# Patient Record
Sex: FEMALE | Race: BLACK OR AFRICAN AMERICAN | NOT HISPANIC OR LATINO | Employment: FULL TIME | URBAN - METROPOLITAN AREA
[De-identification: names, ages, dates, MRNs, and addresses within clinical notes are randomized per-mention and may not be internally consistent; named-entity substitution may affect disease eponyms.]

---

## 2018-04-11 ENCOUNTER — HISTORICAL (OUTPATIENT)
Dept: ADMINISTRATIVE | Facility: HOSPITAL | Age: 28
End: 2018-04-11

## 2018-04-11 LAB
HAV IGM SERPL QL IA: NONREACTIVE
HBV CORE IGM SERPL QL IA: NONREACTIVE
HBV SURFACE AG SERPL QL IA: NEGATIVE
HCV AB SERPL QL IA: NONREACTIVE
HIV 1+2 AB+HIV1 P24 AG SERPL QL IA: NONREACTIVE
POC BETA-HCG (QUAL): NEGATIVE
T PALLIDUM AB SER QL: NONREACTIVE

## 2018-07-25 LAB — POC BETA-HCG (QUAL): NEGATIVE

## 2019-05-08 LAB — POC BETA-HCG (QUAL): NEGATIVE

## 2019-06-15 LAB
BILIRUB SERPL-MCNC: NORMAL MG/DL
BLOOD URINE, POC: NEGATIVE
CLARITY, POC UA: NORMAL
COLOR, POC UA: NORMAL
GLUCOSE UR QL STRIP: NEGATIVE
KETONES UR QL STRIP: NEGATIVE
LEUKOCYTE EST, POC UA: NEGATIVE
NITRITE, POC UA: NEGATIVE
PH, POC UA: 6
PROTEIN, POC: NORMAL
SPECIFIC GRAVITY, POC UA: 1.02
UROBILINOGEN, POC UA: NORMAL

## 2019-08-09 ENCOUNTER — HISTORICAL (OUTPATIENT)
Dept: ADMINISTRATIVE | Facility: HOSPITAL | Age: 29
End: 2019-08-09

## 2019-08-09 LAB
B-HCG SERPL QL: NEGATIVE
FSH SERPL-ACNC: 5.2 MIU/ML
LH SERPL-ACNC: 11.1 MIU/ML
POC BETA-HCG (QUAL): NEGATIVE
T4 FREE SERPL-MCNC: 0.88 NG/DL (ref 0.76–1.46)
TSH SERPL-ACNC: 1.67 MIU/L (ref 0.36–3.74)

## 2019-08-23 ENCOUNTER — HISTORICAL (OUTPATIENT)
Dept: RADIOLOGY | Facility: HOSPITAL | Age: 29
End: 2019-08-23

## 2022-04-11 ENCOUNTER — HISTORICAL (OUTPATIENT)
Dept: ADMINISTRATIVE | Facility: HOSPITAL | Age: 32
End: 2022-04-11

## 2022-04-28 VITALS
OXYGEN SATURATION: 100 % | BODY MASS INDEX: 31.77 KG/M2 | DIASTOLIC BLOOD PRESSURE: 78 MMHG | SYSTOLIC BLOOD PRESSURE: 113 MMHG | HEIGHT: 72 IN | WEIGHT: 234.56 LBS

## 2022-09-21 ENCOUNTER — HISTORICAL (OUTPATIENT)
Dept: ADMINISTRATIVE | Facility: HOSPITAL | Age: 32
End: 2022-09-21

## 2024-03-19 ENCOUNTER — HOSPITAL ENCOUNTER (EMERGENCY)
Facility: HOSPITAL | Age: 34
Discharge: HOME | End: 2024-03-19
Attending: EMERGENCY MEDICINE
Payer: MEDICAID

## 2024-03-19 VITALS
SYSTOLIC BLOOD PRESSURE: 127 MMHG | TEMPERATURE: 98 F | BODY MASS INDEX: 32.67 KG/M2 | HEIGHT: 72 IN | HEART RATE: 73 BPM | OXYGEN SATURATION: 100 % | DIASTOLIC BLOOD PRESSURE: 92 MMHG | RESPIRATION RATE: 16 BRPM | WEIGHT: 241.19 LBS

## 2024-03-19 DIAGNOSIS — Z13.9 SCREENING DUE: ICD-10-CM

## 2024-03-19 DIAGNOSIS — R07.9 CHEST PAIN: ICD-10-CM

## 2024-03-19 DIAGNOSIS — K29.70 GASTRITIS, PRESENCE OF BLEEDING UNSPECIFIED, UNSPECIFIED CHRONICITY, UNSPECIFIED GASTRITIS TYPE: Primary | ICD-10-CM

## 2024-03-19 LAB
ALBUMIN SERPL-MCNC: 3.8 G/DL (ref 3.5–5)
ALBUMIN/GLOB SERPL: 0.9 RATIO (ref 1.1–2)
ALP SERPL-CCNC: 91 UNIT/L (ref 40–150)
ALT SERPL-CCNC: 15 UNIT/L (ref 0–55)
AST SERPL-CCNC: 16 UNIT/L (ref 5–34)
BASOPHILS # BLD AUTO: 0.03 X10(3)/MCL
BASOPHILS NFR BLD AUTO: 0.5 %
BILIRUB SERPL-MCNC: 0.4 MG/DL
BUN SERPL-MCNC: 5.7 MG/DL (ref 7–18.7)
CALCIUM SERPL-MCNC: 9.9 MG/DL (ref 8.4–10.2)
CHLORIDE SERPL-SCNC: 104 MMOL/L (ref 98–107)
CK MB SERPL-MCNC: <1 NG/ML
CK SERPL-CCNC: 163 U/L (ref 29–168)
CO2 SERPL-SCNC: 28 MMOL/L (ref 22–29)
CREAT SERPL-MCNC: 0.67 MG/DL (ref 0.55–1.02)
EOSINOPHIL # BLD AUTO: 0.12 X10(3)/MCL (ref 0–0.9)
EOSINOPHIL NFR BLD AUTO: 2 %
ERYTHROCYTE [DISTWIDTH] IN BLOOD BY AUTOMATED COUNT: 12.9 % (ref 11.5–17)
GFR SERPLBLD CREATININE-BSD FMLA CKD-EPI: >60 MLS/MIN/1.73/M2
GLOBULIN SER-MCNC: 4.2 GM/DL (ref 2.4–3.5)
GLUCOSE SERPL-MCNC: 96 MG/DL (ref 74–100)
HCT VFR BLD AUTO: 38.4 % (ref 37–47)
HGB BLD-MCNC: 13.3 G/DL (ref 12–16)
HOLD SPECIMEN: NORMAL
HOLD SPECIMEN: NORMAL
IMM GRANULOCYTES # BLD AUTO: 0.01 X10(3)/MCL (ref 0–0.04)
IMM GRANULOCYTES NFR BLD AUTO: 0.2 %
LYMPHOCYTES # BLD AUTO: 2.55 X10(3)/MCL (ref 0.6–4.6)
LYMPHOCYTES NFR BLD AUTO: 42.6 %
MCH RBC QN AUTO: 30.3 PG (ref 27–31)
MCHC RBC AUTO-ENTMCNC: 34.6 G/DL (ref 33–36)
MCV RBC AUTO: 87.5 FL (ref 80–94)
MONOCYTES # BLD AUTO: 0.29 X10(3)/MCL (ref 0.1–1.3)
MONOCYTES NFR BLD AUTO: 4.8 %
NEUTROPHILS # BLD AUTO: 2.99 X10(3)/MCL (ref 2.1–9.2)
NEUTROPHILS NFR BLD AUTO: 49.9 %
NRBC BLD AUTO-RTO: 0 %
OHS QRS DURATION: 80 MS
OHS QTC CALCULATION: 413 MS
PLATELET # BLD AUTO: 232 X10(3)/MCL (ref 130–400)
PMV BLD AUTO: 9.6 FL (ref 7.4–10.4)
POTASSIUM SERPL-SCNC: 3.5 MMOL/L (ref 3.5–5.1)
PROT SERPL-MCNC: 8 GM/DL (ref 6.4–8.3)
RBC # BLD AUTO: 4.39 X10(6)/MCL (ref 4.2–5.4)
SODIUM SERPL-SCNC: 138 MMOL/L (ref 136–145)
TROPONIN I SERPL-MCNC: <0.01 NG/ML (ref 0–0.04)
WBC # SPEC AUTO: 5.99 X10(3)/MCL (ref 4.5–11.5)

## 2024-03-19 PROCEDURE — 93005 ELECTROCARDIOGRAM TRACING: CPT

## 2024-03-19 PROCEDURE — 25000003 PHARM REV CODE 250: Performed by: EMERGENCY MEDICINE

## 2024-03-19 PROCEDURE — 99285 EMERGENCY DEPT VISIT HI MDM: CPT | Mod: 25

## 2024-03-19 PROCEDURE — 80053 COMPREHEN METABOLIC PANEL: CPT | Performed by: EMERGENCY MEDICINE

## 2024-03-19 PROCEDURE — 85025 COMPLETE CBC W/AUTO DIFF WBC: CPT | Performed by: EMERGENCY MEDICINE

## 2024-03-19 PROCEDURE — 84484 ASSAY OF TROPONIN QUANT: CPT | Performed by: EMERGENCY MEDICINE

## 2024-03-19 PROCEDURE — 82553 CREATINE MB FRACTION: CPT | Performed by: EMERGENCY MEDICINE

## 2024-03-19 PROCEDURE — 82550 ASSAY OF CK (CPK): CPT | Performed by: EMERGENCY MEDICINE

## 2024-03-19 RX ORDER — ALUMINUM HYDROXIDE, MAGNESIUM HYDROXIDE, AND SIMETHICONE 1200; 120; 1200 MG/30ML; MG/30ML; MG/30ML
30 SUSPENSION ORAL
Status: COMPLETED | OUTPATIENT
Start: 2024-03-19 | End: 2024-03-19

## 2024-03-19 RX ORDER — ASPIRIN 325 MG
325 TABLET ORAL
Status: COMPLETED | OUTPATIENT
Start: 2024-03-19 | End: 2024-03-19

## 2024-03-19 RX ADMIN — ASPIRIN 325 MG ORAL TABLET 325 MG: 325 PILL ORAL at 12:03

## 2024-03-19 RX ADMIN — ALUMINUM HYDROXIDE, MAGNESIUM HYDROXIDE, AND DIMETHICONE 30 ML: 200; 20; 200 SUSPENSION ORAL at 12:03

## 2024-03-19 NOTE — ED PROVIDER NOTES
Encounter Date: 3/19/2024       History     Chief Complaint   Patient presents with    Chest Pain     Pt in with complaints of chest pain that started a couple of weeks ago after starting blood pressure medication.     This is a 33-year-old woman who presents with chest pain.  She reports ongoing intermittent symptoms for 2 months.  She was seen twice previously in the ED in [facility] at which time lab data, chest x-ray, CT angiography negative.  She was started on medications for blood pressure, which she is still taking.  Patient had been recommended a stress test, which has not been completed given she moved to [place].  She is presently requesting she outpatient follow up for ongoing health maintenance.        Review of patient's allergies indicates:   Allergen Reactions    Bactrim [sulfamethoxazole-trimethoprim] Rash    Hydrocodone Rash     Past Medical History:   Diagnosis Date    Hypertension      Past Surgical History:   Procedure Laterality Date     SECTION  2016     Family History   Problem Relation Name Age of Onset    Hypertension Mother      Hypertension Father      Diabetes Sister      Hypertension Sister      Hypertension Brother       Social History     Tobacco Use    Smoking status: Never    Smokeless tobacco: Never   Substance Use Topics    Alcohol use: Yes     Comment: Wine on occasions    Drug use: Never     Review of Systems    Physical Exam     Initial Vitals [24 1226]   BP Pulse Resp Temp SpO2   (!) 138/100 94 14 97.8 °F (36.6 °C) 100 %      MAP       --         Physical Exam    Nursing note and vitals reviewed.  Constitutional: She appears well-developed and well-nourished. She is not diaphoretic. No distress.   HENT:   Head: Normocephalic and atraumatic.   Right Ear: External ear normal.   Left Ear: External ear normal.   Eyes: EOM are normal. Pupils are equal, round, and reactive to light. Right eye exhibits no discharge. Left eye exhibits no discharge.   Neck: Neck  supple. No thyromegaly present. No tracheal deviation present. No JVD present.   Normal range of motion.  Cardiovascular:  Normal rate, regular rhythm, normal heart sounds and intact distal pulses.     Exam reveals no gallop and no friction rub.       No murmur heard.  Pulmonary/Chest: Breath sounds normal. No stridor. No respiratory distress. She has no wheezes. She has no rhonchi. She has no rales.   Abdominal: Abdomen is soft. Bowel sounds are normal. She exhibits no distension. There is no abdominal tenderness. There is no rebound and no guarding.   Musculoskeletal:         General: No tenderness or edema. Normal range of motion.      Cervical back: Normal range of motion and neck supple.     Neurological: She is alert and oriented to person, place, and time. She has normal strength. No cranial nerve deficit or sensory deficit. GCS score is 15. GCS eye subscore is 4. GCS verbal subscore is 5. GCS motor subscore is 6.   Skin: Skin is warm and dry. Capillary refill takes less than 2 seconds. No rash and no abscess noted. No erythema. No pallor.   Psychiatric: She has a normal mood and affect. Her behavior is normal. Judgment and thought content normal.         ED Course   Procedures  Labs Reviewed   COMPREHENSIVE METABOLIC PANEL - Abnormal; Notable for the following components:       Result Value    Blood Urea Nitrogen 5.7 (*)     Globulin 4.2 (*)     Albumin/Globulin Ratio 0.9 (*)     All other components within normal limits   CK-MB - Normal   CK - Normal   TROPONIN I - Normal   CBC W/ AUTO DIFFERENTIAL    Narrative:     The following orders were created for panel order CBC auto differential.  Procedure                               Abnormality         Status                     ---------                               -----------         ------                     CBC with Differential[[id number]]                           Final result                 Please view results for these tests on the individual orders.    CBC WITH DIFFERENTIAL   EXTRA TUBES    Narrative:     The following orders were created for panel order EXTRA TUBES.  Procedure                               Abnormality         Status                     ---------                               -----------         ------                     Light Blue Top Hold[[id number]]                             Final result               Gold Top Hold[[id number]]                                   Final result                 Please view results for these tests on the individual orders.   LIGHT BLUE TOP HOLD   GOLD TOP HOLD     EKG Readings: (Independently Interpreted)   NSR @ 81, non acute and non ischemic appearing ;     ECG Results              EKG 12-lead (Final result)        Collection Time Result Time QRS Duration [facility] QTC Calculation    03/19/24 13:02:11 03/20/24 18:54:23 78 423                     Final result by Interface, Lab In [facility] (03/20/24 18:54:31)                   Narrative:    Test Reason : Z13.9,    Vent. Rate : 083 BPM     Atrial Rate : 083 BPM     P-R Int : 154 ms          QRS Dur : 078 ms      QT Int : 360 ms       P-R-T Axes : 072 051 030 degrees     QTc Int : 423 ms    Normal sinus rhythm  Normal ECG  When compared with ECG of 19-MAR-2024 12:23,  No significant change was found  Confirmed by Oleg Giles MD [id number]) on 3/20/2024 6:54:18 PM    Referred By: [email or web address]   SELF           Confirmed By:Oleg Giles MD                                     EKG 12-lead (Chest Pain) Age >30 (Final result)        Collection Time Result Time QRS Duration OHS QTC Calculation    03/19/24 12:23:48 03/19/24 14:51:39 80 413                     Final result by Interface, Lab In [facility] (03/19/24 14:51:47)                   Narrative:    Test Reason : R07.9,    Vent. Rate : 081 BPM     Atrial Rate : 081 BPM     P-R Int : 152 ms          QRS Dur : 080 ms      QT Int : 356 ms       P-R-T Axes : 056 012 020 degrees     QTc Int : 413 ms    Normal sinus rhythm  Normal  ECG  No previous ECGs available  Confirmed by Alice Reeves MD ([id number]) on 3/19/2024 2:51:36 PM    Referred By:             Confirmed By:Alice Reeves MD                                     EKG 12-LEAD (Final result)  Result time 03/22/24 12:23:07      Final result by Unknown User (03/22/24 12:23:07)                                      Imaging Results              X-Ray Chest AP Portable (Final result)  Result time 03/19/24 14:00:23      Final result by Shreyas Jackson MD (03/19/24 14:00:23)                   Impression:      No acute chest disease is identified.      Electronically signed by: Shreyas Jackson  Date:    03/19/2024  Time:    14:00               Narrative:    EXAMINATION:  XR CHEST AP PORTABLE    CLINICAL HISTORY:  , Chest pain, unspecified.    COMPARISON:  April 15, 2015    FINDINGS:  No alveolar consolidation, effusion, or pneumothorax is seen.   The thoracic aorta is normal  cardiac silhouette, central pulmonary vessels and mediastinum are normal in size and are grossly unremarkable.   visualized osseous structures are grossly unremarkable.                                    X-Rays:   Independently Interpreted Readings:   Other Readings:  - cxr without acute abnormal findings ;    Medications   aluminum-magnesium hydroxide-simethicone 200-200-20 mg/5 mL suspension 30 mL (30 mLs Oral Given 3/19/24 1255)   aspirin tablet 325 mg (325 mg Oral Given 3/19/24 1255)     Medical Decision Making  Patient is here with continuing abdominal pain seemingly related to exertion.  Patient has had a largely negative evaluation thus far.  Patient has moved to [place] and is seeking links to outpatient follow up for possible stress testing.  Findings today seemingly most compatible with GERD.    Amount and/or Complexity of Data Reviewed  Labs: ordered. Decision-making details documented in ED Course.     Details: As above;  Radiology: ordered and independent interpretation performed. Decision-making details  documented in ED Course.     Details: - cxr without acute abnormal findings ;  ECG/medicine tests: ordered and independent interpretation performed. Decision-making details documented in ED Course.     Details: NSR @ 81, non acute and non ischemic appearing ;    Risk  OTC drugs.  Risk Details: Risk found sufficient to obtain somewhat expanded evaluation with objective data today.  The data resulted in found reassuring.  Patient is improved with conservative interventions.  Plan home with anticipatory guidance, links to follow up, return precautions.  Discharged in stable condition without event.               ED Course as of 04/15/24 0801   Tue Mar 19, 2024   1331 Reassuring chemistries ; [CT]   1332 Normal total ck ; [CT]   1332 Normal hemogram ; [CT]   1332 Negative troponin ; [CT]   1335 Normal ckmb ; [CT]      ED Course User Index  [CT] Igor Ugarte MD                           Clinical Impression:  Final diagnoses:  [R07.9] Chest pain  [Z13.9] Screening due  [K29.70] Gastritis, presence of bleeding unspecified, unspecified chronicity, unspecified gastritis type (Primary)          ED Disposition Condition    Discharge Stable          ED Prescriptions    None       Follow-up Information       Follow up With Specialties Details Why Contact Info    [facility] - Emergency Dept Emergency Medicine  As needed, If symptoms worsen [address] [facility] [id number] [phone]    Internal medicine    Expected telephone call to set up your follow up appointment.             Igor Ugarte MD  03/19/24 1417       Igor Ugarte MD  04/15/24 0705

## 2024-03-20 LAB
OHS QRS DURATION: 78 MS
OHS QTC CALCULATION: 423 MS

## 2024-04-03 ENCOUNTER — OFFICE VISIT (OUTPATIENT)
Dept: INTERNAL MEDICINE | Facility: CLINIC | Age: 34
End: 2024-04-03
Payer: MEDICAID

## 2024-04-03 VITALS
BODY MASS INDEX: 32.4 KG/M2 | TEMPERATURE: 98 F | DIASTOLIC BLOOD PRESSURE: 80 MMHG | RESPIRATION RATE: 18 BRPM | WEIGHT: 239.19 LBS | HEART RATE: 89 BPM | HEIGHT: 72 IN | SYSTOLIC BLOOD PRESSURE: 123 MMHG

## 2024-04-03 DIAGNOSIS — E78.49 OTHER HYPERLIPIDEMIA: ICD-10-CM

## 2024-04-03 DIAGNOSIS — I10 HYPERTENSION, UNSPECIFIED TYPE: ICD-10-CM

## 2024-04-03 DIAGNOSIS — E66.9 OBESITY, UNSPECIFIED CLASSIFICATION, UNSPECIFIED OBESITY TYPE, UNSPECIFIED WHETHER SERIOUS COMORBIDITY PRESENT: ICD-10-CM

## 2024-04-03 DIAGNOSIS — Z00.00 WELLNESS EXAMINATION: ICD-10-CM

## 2024-04-03 DIAGNOSIS — Z11.3 SCREEN FOR STD (SEXUALLY TRANSMITTED DISEASE): ICD-10-CM

## 2024-04-03 PROCEDURE — 3074F SYST BP LT 130 MM HG: CPT | Mod: CPTII,,, | Performed by: NURSE PRACTITIONER

## 2024-04-03 PROCEDURE — 3008F BODY MASS INDEX DOCD: CPT | Mod: CPTII,,, | Performed by: NURSE PRACTITIONER

## 2024-04-03 PROCEDURE — 99214 OFFICE O/P EST MOD 30 MIN: CPT | Mod: PBBFAC | Performed by: NURSE PRACTITIONER

## 2024-04-03 PROCEDURE — 1160F RVW MEDS BY RX/DR IN RCRD: CPT | Mod: CPTII,,, | Performed by: NURSE PRACTITIONER

## 2024-04-03 PROCEDURE — 4010F ACE/ARB THERAPY RXD/TAKEN: CPT | Mod: CPTII,,, | Performed by: NURSE PRACTITIONER

## 2024-04-03 PROCEDURE — 99204 OFFICE O/P NEW MOD 45 MIN: CPT | Mod: S$PBB,,, | Performed by: NURSE PRACTITIONER

## 2024-04-03 PROCEDURE — 1159F MED LIST DOCD IN RCRD: CPT | Mod: CPTII,,, | Performed by: NURSE PRACTITIONER

## 2024-04-03 PROCEDURE — 3079F DIAST BP 80-89 MM HG: CPT | Mod: CPTII,,, | Performed by: NURSE PRACTITIONER

## 2024-04-03 RX ORDER — LOSARTAN POTASSIUM 25 MG/1
25 TABLET ORAL DAILY
COMMUNITY
End: 2024-04-03 | Stop reason: SDUPTHER

## 2024-04-03 RX ORDER — AMLODIPINE BESYLATE 5 MG/1
5 TABLET ORAL NIGHTLY
COMMUNITY
End: 2024-04-03 | Stop reason: SDUPTHER

## 2024-04-03 RX ORDER — OMEPRAZOLE 20 MG/1
20 CAPSULE, DELAYED RELEASE ORAL DAILY
Qty: 30 CAPSULE | Refills: 0 | Status: SHIPPED | OUTPATIENT
Start: 2024-04-03 | End: 2025-04-03

## 2024-04-03 RX ORDER — LOSARTAN POTASSIUM 50 MG/1
50 TABLET ORAL DAILY
Qty: 90 TABLET | Refills: 1 | Status: SHIPPED | OUTPATIENT
Start: 2024-04-03

## 2024-04-03 RX ORDER — AMLODIPINE BESYLATE 5 MG/1
5 TABLET ORAL NIGHTLY
Qty: 90 TABLET | Refills: 1 | Status: SHIPPED | OUTPATIENT
Start: 2024-04-03

## 2024-04-03 NOTE — PROGRESS NOTES
Internal Medicine Clinic  MARCELO Castellon     Patient Name: Van Mariano   : 1990  MRN:[id number]     Chief Complaint     Chief Complaint   Patient presents with    [facility]     Hx of HTN        History of Present Illness     33 year old AAF, presents in clinic to establish PCP. PMH HTN, HLD, GERD, obesity. BMI 32. Nonsmoker. Reports intermittent steven lower ext swelling no pitting.   Denies chest pain, shortness of breath, cough, fever, headache, dizziness, weakness, abdominal pain, nausea, vomiting, diarrhea, constipation , dysuria, depression, anxiety.  Has twins, 7 yrs old (boy and girl). Recently moved to [place], [place] from [place], originally from [place]. Employed by [employer].   LMP 3/22/2024. Last had DEPO shot 2023.          Review of Systems     Review of Systems   Constitutional: Negative.    HENT: Negative.     Eyes: Negative.    Respiratory: Negative.     Cardiovascular: Negative.    Gastrointestinal: Negative.    Endocrine: Negative.    Genitourinary: Negative.    Musculoskeletal: Negative.    Integumentary:  Negative.   Allergic/Immunologic: Negative.    Neurological: Negative.    Hematological: Negative.    Psychiatric/Behavioral: Negative.     All other systems reviewed and are negative.       Physical Examination     Visit Vitals  /80 (BP Location: Left arm, Patient Position: Sitting, BP Method: Large (Automatic))   Pulse 89   Temp 98 °F (36.7 °C) (Oral)   Resp 18   Ht 6' (1.829 m)   Wt 108.5 kg (239 lb 3.2 oz)   BMI 32.44 kg/m²        BP Readings from Last 6 Encounters:   24 123/80   24 (!) 127/92   08/15/19 113/78   ]    Wt Readings from Last 6 Encounters:   24 108.5 kg (239 lb 3.2 oz)   24 109.4 kg (241 lb 2.9 oz)   08/15/19 106.4 kg (234 lb 9.1 oz)   ]      Physical Exam  Vitals and nursing note reviewed.   Constitutional:       Appearance: Normal appearance.   HENT:      Head: Normocephalic and atraumatic.      Right Ear: Tympanic  "membrane, ear canal and external ear normal.      Left Ear: Tympanic membrane, ear canal and external ear normal.      Nose: Nose normal.      Mouth/Throat:      Mouth: Mucous membranes are moist.      Pharynx: Oropharynx is clear.   Eyes:      Extraocular Movements: Extraocular movements intact.      Conjunctiva/sclera: Conjunctivae normal.      Pupils: Pupils are equal, round, and reactive to light.   Cardiovascular:      Rate and Rhythm: Normal rate and regular rhythm.      Pulses: Normal pulses.      Heart sounds: Normal heart sounds.   Pulmonary:      Effort: Pulmonary effort is normal.      Breath sounds: Normal breath sounds.   Abdominal:      General: Abdomen is flat. Bowel sounds are normal.      Palpations: Abdomen is soft.   Musculoskeletal:         General: Normal range of motion.      Cervical back: Normal range of motion and neck supple.   Skin:     General: Skin is warm and dry.      Capillary Refill: Capillary refill takes less than 2 seconds.   Neurological:      General: No focal deficit present.      Mental Status: She is alert and oriented to person, place, and time. Mental status is at baseline.   Psychiatric:         Mood and Affect: Mood normal.         Behavior: Behavior normal.         Thought Content: Thought content normal.         Judgment: Judgment normal.          Labs / Imaging     Chemistry:  Lab Results   Component Value Date     03/19/2024    K 3.5 03/19/2024    CHLORIDE 104 03/19/2024    BUN 5.7 (L) 03/19/2024    CREATININE 0.67 03/19/2024    EGFRNORACEVR >60 03/19/2024    GLUCOSE 96 03/19/2024    CALCIUM 9.9 03/19/2024    ALKPHOS 91 03/19/2024    LABPROT 8.0 03/19/2024    ALBUMIN 3.8 03/19/2024    BILIDIR 0.1 04/01/2020    IBILI 0.30 04/01/2020    AST 16 03/19/2024    ALT 15 03/19/2024        No results found for: "HGBA1C", "MICROALBCREA"     Hematology:  Lab Results   Component Value Date    WBC 5.99 03/19/2024    RBC 4.39 03/19/2024    HGB 13.3 03/19/2024    HCT 38.4 " "03/19/2024    MCV 87.5 03/19/2024    MCH 30.3 03/19/2024    MCHC 34.6 03/19/2024    RDW 12.9 03/19/2024     03/19/2024    MPV 9.6 03/19/2024        Lipid Panel:  No results found for: "CHOL", "HDL", "LDL", "TRIG", "TOTALCHOLEST"     Urine:  Lab Results   Component Value Date    APPEARANCEUA CLEAR 07/06/2019    PROTEINUA Negative 07/06/2019    LEUKOCYTESUR Trace (A) 07/06/2019    RBCUA NONE SEEN 07/06/2019    WBCUA 6 (H) 07/06/2019    BACTERIA 1+ (A) 07/06/2019          Assessment       ICD-10-CM ICD-9-CM   1. Hypertension, unspecified type  I10 401.9   2. Other hyperlipidemia  E78.49 272.4   3. Wellness examination  Z00.00 V70.0   4. Screen for STD (sexually transmitted disease)  Z11.3 V74.5   5. Obesity, unspecified classification, unspecified obesity type, unspecified whether serious comorbidity present  E66.9 278.00        Plan   1. Hypertension, unspecified type  BP and HR stable. Med refills. DASH diet: Eat more fruits, vegetables, and low fat dairy foods.  (Less than 2 grams of sodium per day).  Maintain healthy weight with goal BMI <30.   Exercise 30 minutes per day 5 days per week.  Home medications refilled and continued.   Home BP monitoring encouraged with BP parameters given.      2. Other hyperlipidemia    FLP ordered. Take Omega 3 daily.   Stressed importance of dietary modifications. Follow a low cholesterol, low saturated fat diet with less that 200mg of cholesterol a day.  Avoid fried foods and high saturated fats (high saturated fats less than 7% of calories).  Add Flax Seed/Fish Oil supplements to diet. Increase dietary fiber.  Regular exercise can reduce LDL and raise HDL. Stressed importance of physical activity 5 times per week for 30 minutes per day.      3. Wellness examination    - CBC Auto Differential; Future  - Comprehensive Metabolic Panel; Future  - Lipid Panel; Future  - TSH; Future  - Urinalysis; Future  - Vitamin D; Future  - HIV 1/2 Ag/Ab (4th Gen); Future  - Chlamydia/GC, " PCR; Future  - SYPHILIS ANTIBODY (WITH REFLEX RPR); Future  - Hepatitis Panel, Acute; Future  - Ambulatory referral/consult to Gynecology; Future    4. Screen for STD (sexually transmitted disease)    - Urinalysis; Future  - HIV 1/2 Ag/Ab (4th Gen); Future  - Chlamydia/GC, PCR; Future  - SYPHILIS ANTIBODY (WITH REFLEX RPR); Future  - Hepatitis Panel, Acute; Future    5. Obesity, unspecified classification, unspecified obesity type, unspecified whether serious comorbidity present  Goal BMI <30.  Exercise 5 times a week for 30 minutes per day.  Avoid soda, simple sugars, excessive rice, potatoes or bread. Limit fast foods and fried foods.  Choose complex carbs in moderation (example: green vegetables, beans, oatmeal). Eat plenty of fresh fruits and vegetables with lean meats daily.  Do not skip meals. Eat a balanced portion size.  Avoid fad diets. Consider permanent healthy life style changes.         Current Outpatient Medications   Medication Instructions    amLODIPine (NORVASC) 5 mg, Oral, Nightly    losartan (COZAAR) 50 mg, Oral, Daily    omeprazole (PRILOSEC) 20 mg, Oral, Daily       Orders Placed This Encounter   Procedures    Chlamydia/GC, PCR    CBC Auto Differential    Comprehensive Metabolic Panel    Lipid Panel    TSH    Urinalysis    Vitamin D    HIV 1/2 Ag/Ab (4th Gen)    SYPHILIS ANTIBODY (WITH REFLEX RPR)    Hepatitis Panel, Acute    Ambulatory referral/consult to Gynecology         Future Appointments   Date Time Provider Department Center   4/30/2024  2:20 PM Felipa [facility]lett [facility]    1/8/2025  7:50 AM Anne Alston FNP [facility]          Follow up in about 2 weeks (around 4/17/2024) for lab review.    Labs thoroughly reviewed with patient. Medication refills addressed today.  RTC prn and 2 wks, with labs 1 week prior to the apt.  COVID 19 precautions given to patient.  Patient voices understanding of all discharge instructions.      eFlipa Bartlett  FNP

## 2024-05-21 ENCOUNTER — OFFICE VISIT (OUTPATIENT)
Dept: GYNECOLOGY | Facility: CLINIC | Age: 34
End: 2024-05-21
Payer: MEDICAID

## 2024-05-21 ENCOUNTER — LAB VISIT (OUTPATIENT)
Dept: LAB | Facility: HOSPITAL | Age: 34
End: 2024-05-21
Payer: MEDICAID

## 2024-05-21 VITALS
TEMPERATURE: 99 F | SYSTOLIC BLOOD PRESSURE: 131 MMHG | HEART RATE: 92 BPM | OXYGEN SATURATION: 100 % | DIASTOLIC BLOOD PRESSURE: 68 MMHG | HEIGHT: 72 IN | WEIGHT: 230 LBS | BODY MASS INDEX: 31.15 KG/M2 | RESPIRATION RATE: 14 BRPM

## 2024-05-21 DIAGNOSIS — Z11.3 SCREENING FOR STD (SEXUALLY TRANSMITTED DISEASE): ICD-10-CM

## 2024-05-21 DIAGNOSIS — Z12.4 ENCOUNTER FOR PAPANICOLAOU SMEAR FOR CERVICAL CANCER SCREENING: Primary | ICD-10-CM

## 2024-05-21 DIAGNOSIS — Z11.3 SCREEN FOR STD (SEXUALLY TRANSMITTED DISEASE): ICD-10-CM

## 2024-05-21 DIAGNOSIS — Z00.00 WELLNESS EXAMINATION: ICD-10-CM

## 2024-05-21 LAB
25(OH)D3+25(OH)D2 SERPL-MCNC: 20 NG/ML (ref 30–80)
ALBUMIN SERPL-MCNC: 3.9 G/DL (ref 3.5–5)
ALBUMIN/GLOB SERPL: 0.9 RATIO (ref 1.1–2)
ALP SERPL-CCNC: 79 UNIT/L (ref 40–150)
ALT SERPL-CCNC: 13 UNIT/L (ref 0–55)
ANION GAP SERPL CALC-SCNC: 8 MEQ/L
AST SERPL-CCNC: 19 UNIT/L (ref 5–34)
BASOPHILS # BLD AUTO: 0.03 X10(3)/MCL
BASOPHILS NFR BLD AUTO: 0.6 %
BILIRUB SERPL-MCNC: 0.5 MG/DL
BUN SERPL-MCNC: 6.7 MG/DL (ref 7–18.7)
C TRACH DNA SPEC QL NAA+PROBE: NOT DETECTED
CALCIUM SERPL-MCNC: 10 MG/DL (ref 8.4–10.2)
CHLORIDE SERPL-SCNC: 108 MMOL/L (ref 98–107)
CHOLEST SERPL-MCNC: 177 MG/DL
CHOLEST/HDLC SERPL: 5 {RATIO} (ref 0–5)
CLUE CELLS VAG QL WET PREP: ABNORMAL
CO2 SERPL-SCNC: 25 MMOL/L (ref 22–29)
CREAT SERPL-MCNC: 0.67 MG/DL (ref 0.55–1.02)
CREAT/UREA NIT SERPL: 10
EOSINOPHIL # BLD AUTO: 0.21 X10(3)/MCL (ref 0–0.9)
EOSINOPHIL NFR BLD AUTO: 4.1 %
ERYTHROCYTE [DISTWIDTH] IN BLOOD BY AUTOMATED COUNT: 12.9 % (ref 11.5–17)
GFR SERPLBLD CREATININE-BSD FMLA CKD-EPI: >60 ML/MIN/1.73/M2
GLOBULIN SER-MCNC: 4.2 GM/DL (ref 2.4–3.5)
GLUCOSE SERPL-MCNC: 83 MG/DL (ref 74–100)
HAV IGM SERPL QL IA: NONREACTIVE
HBV CORE IGM SERPL QL IA: NONREACTIVE
HBV SURFACE AG SERPL QL IA: NONREACTIVE
HCT VFR BLD AUTO: 38.1 % (ref 37–47)
HCV AB SERPL QL IA: NONREACTIVE
HDLC SERPL-MCNC: 36 MG/DL (ref 35–60)
HGB BLD-MCNC: 13.1 G/DL (ref 12–16)
HIV 1+2 AB+HIV1 P24 AG SERPL QL IA: NONREACTIVE
IMM GRANULOCYTES # BLD AUTO: 0.01 X10(3)/MCL (ref 0–0.04)
IMM GRANULOCYTES NFR BLD AUTO: 0.2 %
LDLC SERPL CALC-MCNC: 127 MG/DL (ref 50–140)
LYMPHOCYTES # BLD AUTO: 1.95 X10(3)/MCL (ref 0.6–4.6)
LYMPHOCYTES NFR BLD AUTO: 38.2 %
MCH RBC QN AUTO: 30.2 PG (ref 27–31)
MCHC RBC AUTO-ENTMCNC: 34.4 G/DL (ref 33–36)
MCV RBC AUTO: 87.8 FL (ref 80–94)
MONOCYTES # BLD AUTO: 0.46 X10(3)/MCL (ref 0.1–1.3)
MONOCYTES NFR BLD AUTO: 9 %
N GONORRHOEA DNA SPEC QL NAA+PROBE: NOT DETECTED
NEUTROPHILS # BLD AUTO: 2.45 X10(3)/MCL (ref 2.1–9.2)
NEUTROPHILS NFR BLD AUTO: 47.9 %
NRBC BLD AUTO-RTO: 0 %
PLATELET # BLD AUTO: 247 X10(3)/MCL (ref 130–400)
PMV BLD AUTO: 9.9 FL (ref 7.4–10.4)
POTASSIUM SERPL-SCNC: 3.7 MMOL/L (ref 3.5–5.1)
PROT SERPL-MCNC: 8.1 GM/DL (ref 6.4–8.3)
RBC # BLD AUTO: 4.34 X10(6)/MCL (ref 4.2–5.4)
SODIUM SERPL-SCNC: 141 MMOL/L (ref 136–145)
SOURCE (OHS): NORMAL
T PALLIDUM AB SER QL: NONREACTIVE
T VAGINALIS VAG QL WET PREP: ABNORMAL
TRIGL SERPL-MCNC: 72 MG/DL (ref 37–140)
TSH SERPL-ACNC: 1.19 UIU/ML (ref 0.35–4.94)
VLDLC SERPL CALC-MCNC: 14 MG/DL
WBC # SPEC AUTO: 5.11 X10(3)/MCL (ref 4.5–11.5)
WBC #/AREA VAG WET PREP: ABNORMAL
YEAST SPEC QL WET PREP: ABNORMAL

## 2024-05-21 PROCEDURE — 4010F ACE/ARB THERAPY RXD/TAKEN: CPT | Mod: CPTII,,,

## 2024-05-21 PROCEDURE — 80053 COMPREHEN METABOLIC PANEL: CPT

## 2024-05-21 PROCEDURE — 80074 ACUTE HEPATITIS PANEL: CPT

## 2024-05-21 PROCEDURE — 87491 CHLMYD TRACH DNA AMP PROBE: CPT

## 2024-05-21 PROCEDURE — 87624 HPV HI-RISK TYP POOLED RSLT: CPT

## 2024-05-21 PROCEDURE — 85025 COMPLETE CBC W/AUTO DIFF WBC: CPT

## 2024-05-21 PROCEDURE — 99214 OFFICE O/P EST MOD 30 MIN: CPT | Mod: PBBFAC

## 2024-05-21 PROCEDURE — 87210 SMEAR WET MOUNT SALINE/INK: CPT

## 2024-05-21 PROCEDURE — 3008F BODY MASS INDEX DOCD: CPT | Mod: CPTII,,,

## 2024-05-21 PROCEDURE — 87389 HIV-1 AG W/HIV-1&-2 AB AG IA: CPT

## 2024-05-21 PROCEDURE — 3075F SYST BP GE 130 - 139MM HG: CPT | Mod: CPTII,,,

## 2024-05-21 PROCEDURE — 87591 N.GONORRHOEAE DNA AMP PROB: CPT

## 2024-05-21 PROCEDURE — 1159F MED LIST DOCD IN RCRD: CPT | Mod: CPTII,,,

## 2024-05-21 PROCEDURE — 82306 VITAMIN D 25 HYDROXY: CPT

## 2024-05-21 PROCEDURE — 80061 LIPID PANEL: CPT

## 2024-05-21 PROCEDURE — 36415 COLL VENOUS BLD VENIPUNCTURE: CPT

## 2024-05-21 PROCEDURE — 84443 ASSAY THYROID STIM HORMONE: CPT

## 2024-05-21 PROCEDURE — 86780 TREPONEMA PALLIDUM: CPT

## 2024-05-21 PROCEDURE — 3078F DIAST BP <80 MM HG: CPT | Mod: CPTII,,,

## 2024-05-21 PROCEDURE — 99385 PREV VISIT NEW AGE 18-39: CPT | Mod: S$PBB,,,

## 2024-05-21 PROCEDURE — 88174 CYTOPATH C/V AUTO IN FLUID: CPT

## 2024-05-21 NOTE — PROGRESS NOTES
[facility] -  Gynecology / Women's Health Clinic     Subjective:      Patient ID: Van Mariano is a 33 y.o. female.    Chief Complaint:  Gynecologic Exam      History of Present Illness:  The patient  here for annual exam. Her LMP was 24. Period last 5 days and changes pads 5x/day, manageable and regular. Hx of Depo use, stopped use over 1 yr ago. Denies history of abnormal paps. Last pap 2018-NIL. Denies breast or urinary complaints. Denies pelvic pain, abnormal bleeding or discharge. Pt reports hx of genital hsv, last outbreak over 1 year ago. Denies any other STIs in the past and no concerns. Not sexually active. HPV vaccinated. Declines contraception. Denies tobacco use. Dep. screening 0. Denies fly hx of breast, ovarian, uterine or colon cancer.     GYN & OB History:  Patient's last menstrual period was 2024 (exact date).     OB History    Para Term  AB Living   1 1 1         SAB IAB Ectopic Multiple Live Births         1        # Outcome Date GA Lbr Erlin/2nd Weight Sex Type Anes PTL Lv   1A Term            1B Term      CS-LTranv          Past Medical History:   Diagnosis Date    Hypertension         Past Surgical History:   Procedure Laterality Date     SECTION  2016        Social History     Tobacco Use    Smoking status: Never    Smokeless tobacco: Never   Substance and Sexual Activity    Alcohol use: Yes     Comment: Wine on occasions    Drug use: Never    Sexual activity: Not Currently        Current Outpatient Medications   Medication Instructions    amLODIPine (NORVASC) 5 mg, Oral, Nightly    losartan (COZAAR) 50 mg, Oral, Daily    omeprazole (PRILOSEC) 20 mg, Oral, Daily       Review of patient's allergies indicates:   Allergen Reactions    Bactrim [sulfamethoxazole-trimethoprim] Rash    Hydrocodone Rash         Review of Systems:  Review of Systems  Negative except for pertinent findings for positives per HPI.     Objective:     Physical  Exam   Visit Vitals  /68   Pulse 92   Temp 98.6 °F (37 °C) (Oral)   Resp 14   Ht 6' (1.829 m)   Wt 104.3 kg (230 lb)   LMP 05/16/2024 (Exact Date)   SpO2 100%   BMI 31.19 kg/m²       GENERAL: Well-developed female. No acute distress.    SKIN: Normal to inspection, warm and intact.  BREASTS: No rashes or erythema. No masses, lumps, discharge, tenderness.  VULVA: General appearance normal; external genitalia with no lesions or erythema.  VAGINA: Mucosa/vaginal vault pink, no abnormal discharge or lesions.  CERVIX: Pink, nulliparous appearing os, no erythema or abnormal discharge.  BIMANUAL EXAM: reveals a 8 week-sized uterus. The uterus is non tender. Bilateral adnexa reveal no tenderness.  PSYCHIATRIC: Patient is oriented to person, place, and time. Mood and affect are normal.    Assessment:       ICD-10-CM ICD-9-CM   1. Encounter for Papanicolaou smear for cervical cancer screening  Z12.4 V76.2   2. Screening for STD (sexually transmitted disease)  Z11.3 V74.5       Plan:     1. Encounter for Papanicolaou smear for cervical cancer screening  -     Ambulatory referral/consult to Gynecology  -     Liquid-Based Pap Smear, Screening Screening    2. Screening for STD (sexually transmitted disease)  -     Chlamydia/GC, PCR  -     Wet Prep, Genital  -     HIV 1/2 Ag/Ab (4th Gen); Future; Expected date: 05/21/2024  -     Hepatitis C Antibody; Future; Expected date: 05/21/2024  -     Hepatitis B Surface Antigen; Future; Expected date: 05/21/2024  -     SYPHILIS ANTIBODY (WITH REFLEX RPR); Future; Expected date: 05/21/2024    Pap today  STD testing  Call for any GYN concerns  Follow up in about 1 year (around 5/21/2025) for Annual.

## 2024-05-22 ENCOUNTER — TELEPHONE (OUTPATIENT)
Dept: GYNECOLOGY | Facility: CLINIC | Age: 34
End: 2024-05-22
Payer: MEDICAID

## 2024-05-22 DIAGNOSIS — N76.0 BACTERIAL VAGINOSIS: Primary | ICD-10-CM

## 2024-05-22 DIAGNOSIS — B96.89 BACTERIAL VAGINOSIS: Primary | ICD-10-CM

## 2024-05-22 RX ORDER — METRONIDAZOLE 500 MG/1
500 TABLET ORAL 2 TIMES DAILY
Qty: 14 TABLET | Refills: 0 | Status: SHIPPED | OUTPATIENT
Start: 2024-05-22 | End: 2024-05-29

## 2024-05-22 NOTE — TELEPHONE ENCOUNTER
----- Message from MARCELO Brown sent at 5/22/2024 10:02 AM CDT -----  Swab showed clue cells indicating bacterial vaginosis. Not an STD but an infection in the vaginal area when pH is disrupted. Rx sent for Flagyl. No alcohol during and for 48-72 hours after treatment. Use unscented soap and no scented products. More showers than baths. No douching.

## 2024-05-22 NOTE — PROGRESS NOTES
Swab showed clue cells indicating bacterial vaginosis. Not an STD but an infection in the vaginal area when pH is disrupted. Rx sent for Flagyl. No alcohol during and for 48-72 hours after treatment. Use unscented soap and no scented products. More showers than baths. No douching.

## 2024-05-23 LAB
HIGH RISK HPV: NEGATIVE
PSYCHE PATHOLOGY RESULT: NORMAL

## 2024-07-12 ENCOUNTER — TELEPHONE (OUTPATIENT)
Dept: GYNECOLOGY | Facility: CLINIC | Age: 34
End: 2024-07-12
Payer: MEDICAID

## 2024-07-12 NOTE — TELEPHONE ENCOUNTER
Patient is requesting birth control and is schedule for July 22,2024@2550 per Anne Alston NP request. Patient has confirmed her appt with us.

## 2024-07-18 ENCOUNTER — HOSPITAL ENCOUNTER (EMERGENCY)
Facility: HOSPITAL | Age: 34
Discharge: HOME | End: 2024-07-18
Attending: EMERGENCY MEDICINE
Payer: MEDICAID

## 2024-07-18 VITALS
HEART RATE: 73 BPM | SYSTOLIC BLOOD PRESSURE: 143 MMHG | RESPIRATION RATE: 18 BRPM | WEIGHT: 227.06 LBS | BODY MASS INDEX: 30.8 KG/M2 | DIASTOLIC BLOOD PRESSURE: 105 MMHG | OXYGEN SATURATION: 100 % | TEMPERATURE: 98 F

## 2024-07-18 DIAGNOSIS — R07.9 NONSPECIFIC CHEST PAIN: Primary | ICD-10-CM

## 2024-07-18 DIAGNOSIS — I10 HYPERTENSION, UNSPECIFIED TYPE: ICD-10-CM

## 2024-07-18 DIAGNOSIS — Z91.148 HISTORY OF MEDICATION NONCOMPLIANCE: ICD-10-CM

## 2024-07-18 LAB
ALBUMIN SERPL-MCNC: 3.9 G/DL (ref 3.5–5)
ALBUMIN/GLOB SERPL: 1 RATIO (ref 1.1–2)
ALP SERPL-CCNC: 87 UNIT/L (ref 40–150)
ALT SERPL-CCNC: 11 UNIT/L (ref 0–55)
ANION GAP SERPL CALC-SCNC: 10 MEQ/L
AST SERPL-CCNC: 17 UNIT/L (ref 5–34)
BASOPHILS # BLD AUTO: 0.04 X10(3)/MCL
BASOPHILS NFR BLD AUTO: 0.6 %
BILIRUB SERPL-MCNC: 0.4 MG/DL
BUN SERPL-MCNC: 7.1 MG/DL (ref 7–18.7)
CALCIUM SERPL-MCNC: 10.3 MG/DL (ref 8.4–10.2)
CHLORIDE SERPL-SCNC: 105 MMOL/L (ref 98–107)
CO2 SERPL-SCNC: 25 MMOL/L (ref 22–29)
CREAT SERPL-MCNC: 0.68 MG/DL (ref 0.55–1.02)
CREAT/UREA NIT SERPL: 10
EOSINOPHIL # BLD AUTO: 0.13 X10(3)/MCL (ref 0–0.9)
EOSINOPHIL NFR BLD AUTO: 2.1 %
ERYTHROCYTE [DISTWIDTH] IN BLOOD BY AUTOMATED COUNT: 12.9 % (ref 11.5–17)
GFR SERPLBLD CREATININE-BSD FMLA CKD-EPI: >60 ML/MIN/1.73/M2
GLOBULIN SER-MCNC: 4 GM/DL (ref 2.4–3.5)
GLUCOSE SERPL-MCNC: 92 MG/DL (ref 74–100)
HCT VFR BLD AUTO: 41.3 % (ref 37–47)
HGB BLD-MCNC: 14.1 G/DL (ref 12–16)
HOLD SPECIMEN: NORMAL
HOLD SPECIMEN: NORMAL
IMM GRANULOCYTES # BLD AUTO: 0.01 X10(3)/MCL (ref 0–0.04)
IMM GRANULOCYTES NFR BLD AUTO: 0.2 %
LYMPHOCYTES # BLD AUTO: 2.29 X10(3)/MCL (ref 0.6–4.6)
LYMPHOCYTES NFR BLD AUTO: 37.1 %
MCH RBC QN AUTO: 29.7 PG (ref 27–31)
MCHC RBC AUTO-ENTMCNC: 34.1 G/DL (ref 33–36)
MCV RBC AUTO: 86.9 FL (ref 80–94)
MONOCYTES # BLD AUTO: 0.36 X10(3)/MCL (ref 0.1–1.3)
MONOCYTES NFR BLD AUTO: 5.8 %
NEUTROPHILS # BLD AUTO: 3.34 X10(3)/MCL (ref 2.1–9.2)
NEUTROPHILS NFR BLD AUTO: 54.2 %
NRBC BLD AUTO-RTO: 0 %
OHS QRS DURATION: 80 MS
OHS QTC CALCULATION: 395 MS
PLATELET # BLD AUTO: 241 X10(3)/MCL (ref 130–400)
PMV BLD AUTO: 9.1 FL (ref 7.4–10.4)
POTASSIUM SERPL-SCNC: 4.1 MMOL/L (ref 3.5–5.1)
PROT SERPL-MCNC: 7.9 GM/DL (ref 6.4–8.3)
RBC # BLD AUTO: 4.75 X10(6)/MCL (ref 4.2–5.4)
SODIUM SERPL-SCNC: 140 MMOL/L (ref 136–145)
TROPONIN I SERPL-MCNC: <0.01 NG/ML (ref 0–0.04)
WBC # BLD AUTO: 6.17 X10(3)/MCL (ref 4.5–11.5)

## 2024-07-18 PROCEDURE — 93005 ELECTROCARDIOGRAM TRACING: CPT

## 2024-07-18 PROCEDURE — 84484 ASSAY OF TROPONIN QUANT: CPT | Performed by: NURSE PRACTITIONER

## 2024-07-18 PROCEDURE — 99285 EMERGENCY DEPT VISIT HI MDM: CPT | Mod: 25

## 2024-07-18 PROCEDURE — 80053 COMPREHEN METABOLIC PANEL: CPT | Performed by: NURSE PRACTITIONER

## 2024-07-18 PROCEDURE — 85025 COMPLETE CBC W/AUTO DIFF WBC: CPT | Performed by: NURSE PRACTITIONER

## 2024-07-18 RX ORDER — AMLODIPINE BESYLATE 5 MG/1
5 TABLET ORAL NIGHTLY
Qty: 90 TABLET | Refills: 0 | Status: SHIPPED | OUTPATIENT
Start: 2024-07-18

## 2024-07-18 RX ORDER — DICLOFENAC SODIUM 75 MG/1
75 TABLET, DELAYED RELEASE ORAL 2 TIMES DAILY
Qty: 14 TABLET | Refills: 0 | Status: SHIPPED | OUTPATIENT
Start: 2024-07-18 | End: 2024-07-25

## 2024-07-18 NOTE — DISCHARGE INSTRUCTIONS
Keep next scheduled PCP appointment as planned.  Keep a daily log of your blood pressure medication until your appointment.  Take medication as prescribed.  Return to the [facility] ED immediately for worsening chest pain, SOB, or fever.

## 2024-07-18 NOTE — ED PROVIDER NOTES
"Encounter Date: 2024       History     Chief Complaint   Patient presents with    Hypertension     Pt w co elevated BP W BURNING IN CHEST X 2 DAYS.  HX OF HTN NON COMPLIANT W MEDS > 2 MONTHS.  NAD, EKG OBTAINED.     Chest Pain     Pt is a 33 y.o. female who presents to the [facility] ED endorsing chest pain x 2-3 days. 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 Denies SOB, weakness, dizziness, fever, abdominal john, or loss of bowel or bladder control. Hx of HTN. Pt currently hypertensive. Admits to not taking her home medication, states, "I didn't think I needed it." Denies headache or blurry vision. Reports increased stress at home and work as well which "may be part of it."      Review of patient's allergies indicates:   Allergen Reactions    Bactrim [sulfamethoxazole-trimethoprim] Rash    Hydrocodone Rash     Past Medical History:   Diagnosis Date    Hypertension      Past Surgical History:   Procedure Laterality Date     SECTION  2016     Family History   Problem Relation Name Age of Onset    Hypertension Mother      Hypertension Father      Diabetes Sister      Hypertension Sister      Hypertension Brother       Social History     Tobacco Use    Smoking status: Never    Smokeless tobacco: Never   Substance Use Topics    Alcohol use: Yes     Comment: Wine on occasions    Drug use: Never     Review of Systems   Constitutional:  Negative for chills, diaphoresis, fatigue and fever.   HENT:  Negative for facial swelling, rhinorrhea, sinus pressure, sinus pain, sore throat and trouble swallowing.    Respiratory:  Negative for cough, chest tightness, shortness of breath and wheezing.    Cardiovascular:  Positive for chest pain. Negative for palpitations and leg swelling.   Gastrointestinal:  Negative for abdominal pain, diarrhea, nausea and vomiting.   Genitourinary:  Negative for dysuria, flank pain, frequency, hematuria and urgency. "   Musculoskeletal:  Negative for arthralgias, back pain, joint swelling and myalgias.   Skin:  Negative for color change and rash.   Neurological:  Negative for dizziness, syncope, weakness and light-headedness.   Hematological:  Does not bruise/bleed easily.   All other systems reviewed and are negative.      Physical Exam     Initial Vitals [07/18/24 0954]   BP Pulse Resp Temp SpO2   (!) 148/105 75 18 97.9 °F (36.6 °C) 100 %      MAP       --         Physical Exam    Nursing note and vitals reviewed.  Constitutional: She appears well-developed and well-nourished.   HENT:   Head: Normocephalic and atraumatic.   Nose: Nose normal.   Mouth/Throat: Oropharynx is clear and moist.   Eyes: Conjunctivae and EOM are normal. Pupils are equal, round, and reactive to light.   Neck: Neck supple.   Normal range of motion.  Cardiovascular:  Normal rate, regular rhythm, normal heart sounds and intact distal pulses.           Pulmonary/Chest: Effort normal and breath sounds normal. No respiratory distress. She has no wheezes. She has no rhonchi. She has no rales. She exhibits tenderness. She exhibits no edema, no deformity, no swelling and no retraction.     Abdominal: Abdomen is soft and flat. Bowel sounds are normal. She exhibits no distension. There is no abdominal tenderness. There is no rebound, no guarding, no tenderness at McBurney's point and negative Haynes's sign. negative psoas sign  Musculoskeletal:         General: Normal range of motion.      Cervical back: Normal range of motion and neck supple.     Neurological: She is alert and oriented to person, place, and time. She has normal strength and normal reflexes.   Skin: Skin is warm and dry. Capillary refill takes less than 2 seconds.   Psychiatric: She has a normal mood and affect. Her speech is normal and behavior is normal. Judgment and thought content normal.         ED Course   Procedures  Labs Reviewed   COMPREHENSIVE METABOLIC PANEL - Abnormal; Notable for the  following components:       Result Value    Calcium 10.3 (*)     Globulin 4.0 (*)     Albumin/Globulin Ratio 1.0 (*)     All other components within normal limits   TROPONIN I - Normal   CBC W/ AUTO DIFFERENTIAL    Narrative:     The following orders were created for panel order CBC auto differential.  Procedure                               Abnormality         Status                     ---------                               -----------         ------                     CBC with Differential[[id number]]                           Final result                 Please view results for these tests on the individual orders.   CBC WITH DIFFERENTIAL   EXTRA TUBES    Narrative:     The following orders were created for panel order EXTRA TUBES.  Procedure                               Abnormality         Status                     ---------                               -----------         ------                     Light Blue Top Hold[[id number]]                             In process                 Gold Top Hold[[id number]]                                   In process                   Please view results for these tests on the individual orders.   LIGHT BLUE TOP HOLD   GOLD TOP HOLD     EKG Readings: (Independently Interpreted)   Initial Reading: No STEMI. Previous EKG: Compared with most recent EKG Previous EKG Date: 5/19/24. Rhythm: Normal Sinus Rhythm. Heart Rate: 67. Ectopy: No Ectopy. Conduction: Normal. ST Segments: Normal ST Segments. T Waves: Normal. Clinical Impression: Normal Sinus Rhythm   Normal sinus rhythm     ECG Results              EKG 12-lead (Chest Pain) Age >30 (In process)        Collection Time Result Time QRS Duration [facility] QTC Calculation    07/18/24 09:57:45 07/18/24 09:59:19 80 395                     In process by Interface, Lab In [facility] (07/18/24 09:59:27)                   Narrative:    Test Reason : R07.9,    Vent. Rate : 067 BPM     Atrial Rate : 067 BPM     P-R Int : 152 ms          QRS  Dur : 080 ms      QT Int : 374 ms       P-R-T Axes : 074 025 050 degrees     QTc Int : 395 ms    Normal sinus rhythm  Normal ECG  When compared with ECG of 19-MAR-2024 13:02,  No significant change was found    Referred By:             Confirmed By:                                   Imaging Results              X-Ray Chest 1 View (Final result)  Result time 07/18/24 11:14:43      Final result by Juan Killian MD (07/18/24 11:14:43)                   Impression:      Right infrahilar new opacities could represent infiltrates.  Please correlate clinically.      Electronically signed by: Juan Killian  Date:    07/18/2024  Time:    11:14               Narrative:    EXAMINATION:  XR CHEST 1 VIEW    CLINICAL HISTORY:  Chest pain, unspecified    TECHNIQUE:  One view    COMPARISON:  March 19, 2024..    FINDINGS:  Cardiopericardial silhouette is within normal limits.  There are new patchy and coarse reticular opacities involving the infrahilar location of the right lower lung lobe.  Lungs otherwise are clear.  No fluid within the pleural spaces.                                       Medications - No data to display  Medical Decision Making  Differential:  AMI  Nonspecific chest pain  Muscle strain  HTN  Medication noncompliance    Amount and/or Complexity of Data Reviewed  Labs: ordered.  Radiology: ordered.               ED Course as of 07/18/24 1123   Thu Jul 18, 2024   1119 Given strict ED return precautions. I have spoken with the patient and/or caregivers. I have explained the patient's condition, diagnoses and treatment plan based on the information available to me at this time. I have answered the patient's and/or caregiver's questions and addressed any concerns. The patient and/or caregivers have as good an understanding of the patient's diagnosis, condition and treatment plan as can be expected at this point. The vital signs have been stable. The patient's condition is stable and appropriate for discharge from  the emergency department.      The patient will pursue further outpatient evaluation with the primary care physician or other designated or consulting physician as outlined in the discharge instructions. The patient and/or caregivers are agreeable to this plan of care and follow-up instructions have been explained in detail. The patient and/or caregivers have received these instructions in written format and have expressed an understanding of the discharge instructions. The patient and/or caregivers are aware that any significant change in condition or worsening of symptoms should prompt an immediate return to this or the closest emergency department or a call to 911.   [JA]      ED Course User Index  [JA] Miguel Angel Mosqueda Jr., FNP                           Clinical Impression:  Final diagnoses:  [R07.9] Nonspecific chest pain (Primary)  [I10] Hypertension, unspecified type  [Z91.148] History of medication noncompliance          ED Disposition Condition    Discharge Stable          ED Prescriptions       Medication Sig Dispense Start Date End Date Auth. Provider    amLODIPine (NORVASC) 5 MG tablet Take 1 tablet (5 mg total) by mouth nightly. 90 tablet 7/18/2024 -- Miguel Angel Mosqueda Jr., FNP    diclofenac (VOLTAREN) 75 MG EC tablet Take 1 tablet (75 mg total) by mouth 2 (two) times daily. for 7 days 14 tablet 7/18/2024 7/25/2024 Miguel Angel Mosqueda Jr., FNP          Follow-up Information       Follow up With Specialties Details Why Contact Info    Felipa Bartlett FNP Family Medicine In 3 days  [address] [address]  [phone]               Miguel Angel Mosqueda Jr., FNP  07/18/24 2634

## 2024-07-22 ENCOUNTER — OFFICE VISIT (OUTPATIENT)
Dept: GYNECOLOGY | Facility: CLINIC | Age: 34
End: 2024-07-22
Payer: MEDICAID

## 2024-07-22 VITALS
HEART RATE: 78 BPM | BODY MASS INDEX: 31.18 KG/M2 | RESPIRATION RATE: 18 BRPM | WEIGHT: 230.19 LBS | HEIGHT: 72 IN | OXYGEN SATURATION: 100 % | TEMPERATURE: 98 F | DIASTOLIC BLOOD PRESSURE: 68 MMHG | SYSTOLIC BLOOD PRESSURE: 138 MMHG

## 2024-07-22 DIAGNOSIS — Z30.013 ENCOUNTER FOR INITIAL PRESCRIPTION OF INJECTABLE CONTRACEPTIVE: Primary | ICD-10-CM

## 2024-07-22 DIAGNOSIS — Z12.4 ENCOUNTER FOR PAPANICOLAOU SMEAR FOR CERVICAL CANCER SCREENING: ICD-10-CM

## 2024-07-22 LAB
B-HCG UR QL: NEGATIVE
CTP QC/QA: YES

## 2024-07-22 PROCEDURE — 99214 OFFICE O/P EST MOD 30 MIN: CPT | Mod: PBBFAC

## 2024-07-22 PROCEDURE — 1159F MED LIST DOCD IN RCRD: CPT | Mod: CPTII,,,

## 2024-07-22 PROCEDURE — 3075F SYST BP GE 130 - 139MM HG: CPT | Mod: CPTII,,,

## 2024-07-22 PROCEDURE — 4010F ACE/ARB THERAPY RXD/TAKEN: CPT | Mod: CPTII,,,

## 2024-07-22 PROCEDURE — 99213 OFFICE O/P EST LOW 20 MIN: CPT | Mod: S$PBB,,,

## 2024-07-22 PROCEDURE — 3078F DIAST BP <80 MM HG: CPT | Mod: CPTII,,,

## 2024-07-22 PROCEDURE — 81025 URINE PREGNANCY TEST: CPT | Mod: PBBFAC

## 2024-07-22 PROCEDURE — 3008F BODY MASS INDEX DOCD: CPT | Mod: CPTII,,,

## 2024-07-22 RX ORDER — MEDROXYPROGESTERONE ACETATE 150 MG/ML
150 INJECTION, SUSPENSION INTRAMUSCULAR
Qty: 1 ML | Refills: 3 | Status: SHIPPED | OUTPATIENT
Start: 2024-07-22

## 2024-07-22 RX ORDER — MEDROXYPROGESTERONE ACETATE 150 MG/ML
150 INJECTION, SUSPENSION INTRAMUSCULAR
Status: COMPLETED | OUTPATIENT
Start: 2024-07-22 | End: 2024-07-22

## 2024-07-22 RX ADMIN — MEDROXYPROGESTERONE ACETATE 150 MG: 150 INJECTION, SUSPENSION INTRAMUSCULAR at 02:07

## 2024-07-22 NOTE — PROGRESS NOTES
[facility] -  Gynecology / Women's Health Clinic     Subjective:      Patient ID: Van Mariano is a 33 y.o. female.    Chief Complaint:  Follow-up      History of Present Illness:  The patient presents to the clinic for contraception management. LMP 24 lasting 6 days changing pads 4-5x/day, cycles manageable and monthly. Admitted last sexual intercourse more than 3 months ago. Pt requesting for Depo provera shot, hx of Depo use.     GYN & OB History:  Patient's last menstrual period was 2024.   Date of Last Pap: 2024    OB History    Para Term  AB Living   1 1 1         SAB IAB Ectopic Multiple Live Births         1        # Outcome Date GA Lbr Erlin/2nd Weight Sex Type Anes PTL Lv   1A Term            1B Term      CS-LTranv          Past Medical History:   Diagnosis Date    Hypertension         Past Surgical History:   Procedure Laterality Date     SECTION          Social History     Tobacco Use    Smoking status: Never    Smokeless tobacco: Never   Substance and Sexual Activity    Alcohol use: Yes     Comment: Wine on occasions    Drug use: Never    Sexual activity: Not Currently        Current Outpatient Medications   Medication Instructions    amLODIPine (NORVASC) 5 mg, Oral, Nightly    diclofenac (VOLTAREN) 75 mg, Oral, 2 times daily    losartan (COZAAR) 50 mg, Oral, Daily    medroxyPROGESTERone (DEPO-PROVERA) 150 mg, Intramuscular, Every 3 months    omeprazole (PRILOSEC) 20 mg, Oral, Daily       Review of patient's allergies indicates:   Allergen Reactions    Bactrim [sulfamethoxazole-trimethoprim] Rash    Hydrocodone Rash         Review of Systems:  Review of Systems  Negative except for pertinent findings for positives per HPI.     Objective:     Physical Exam   Visit Vitals  /68   Pulse 78   Temp 98 °F (36.7 °C) (Oral)   Resp 18   Ht 6' (1.829 m)   Wt 104.4 kg (230 lb 3.2 oz)   LMP 2024   SpO2 100%   BMI 31.22 kg/m²        GENERAL: Well-developed female. No acute distress.    SKIN: Normal to inspection, warm and intact.  PSYCHIATRIC: Patient is oriented to person, place, and time. Mood and affect are normal.    Assessment:       ICD-10-CM ICD-9-CM   1. Encounter for initial prescription of injectable contraceptive  Z30.013 V25.02       Plan:     1. Encounter for initial prescription of injectable contraceptive  -     medroxyPROGESTERone (DEPO-PROVERA) 150 mg/mL Syrg; Inject 1 mL (150 mg total) into the muscle every 3 (three) months.  Dispense: 1 mL; Refill: 3  -     POCT urine pregnancy    UPT-neg, denies engaging in unprotected intercourse and no unprotected intercourse since LMP.  Depo ordered  Discussed long term use of Depo can cause bone weakening. Diet high in calcium and vit. D. Pt instructed to increase dietary calcium and vit. D intake. Encouraged dietary calcium rich foods like broccoli, mustard, turnip greens, oranges, dairy products, sardines, salmon, and almonds. Encouraged Vit. D absorption with 30 mins in sun 5 days/week and fatty fish. Increase muscle strengthening and aerobic exercise (walking, climbing, bicycle) at least 30 mins for 3 times/week. Handouts provided.  Discussed risk and benefits of Depo to include weight gain and amenorrhea. Discussed delayed return of ovulation and cycles with Depo, encouraged to stop Depo at least 1 year prior to decision to conceive. Also discussed it can take up to 3-4 shots to regulate cycles with Depo during this time may experience irregular or prolonged bleeding. Pt verbalized understanding.     Call with any GYN concerns  Follow up for Annual.

## 2024-07-22 NOTE — PROGRESS NOTES
Depo Provera 150mg given to the Right Upper Quad per Anne Alston,NP request. Patient left in stable condition.

## 2024-08-15 ENCOUNTER — PATIENT MESSAGE (OUTPATIENT)
Dept: GYNECOLOGY | Facility: CLINIC | Age: 34
End: 2024-08-15
Payer: MEDICAID

## 2024-08-16 ENCOUNTER — LAB VISIT (OUTPATIENT)
Dept: LAB | Facility: HOSPITAL | Age: 34
End: 2024-08-16
Payer: MEDICAID

## 2024-08-16 ENCOUNTER — OFFICE VISIT (OUTPATIENT)
Dept: GYNECOLOGY | Facility: CLINIC | Age: 34
End: 2024-08-16
Payer: MEDICAID

## 2024-08-16 VITALS
BODY MASS INDEX: 30.66 KG/M2 | TEMPERATURE: 98 F | HEIGHT: 72 IN | SYSTOLIC BLOOD PRESSURE: 134 MMHG | HEART RATE: 77 BPM | DIASTOLIC BLOOD PRESSURE: 78 MMHG | WEIGHT: 226.38 LBS | RESPIRATION RATE: 18 BRPM | OXYGEN SATURATION: 100 %

## 2024-08-16 DIAGNOSIS — B00.9 HSV INFECTION: Primary | ICD-10-CM

## 2024-08-16 DIAGNOSIS — B00.9 HSV (HERPES SIMPLEX VIRUS) INFECTION: Primary | ICD-10-CM

## 2024-08-16 DIAGNOSIS — B00.9 HSV INFECTION: ICD-10-CM

## 2024-08-16 PROCEDURE — 36415 COLL VENOUS BLD VENIPUNCTURE: CPT

## 2024-08-16 PROCEDURE — 86695 HERPES SIMPLEX TYPE 1 TEST: CPT

## 2024-08-16 PROCEDURE — 86696 HERPES SIMPLEX TYPE 2 TEST: CPT

## 2024-08-16 PROCEDURE — 99213 OFFICE O/P EST LOW 20 MIN: CPT | Mod: PBBFAC

## 2024-08-16 PROCEDURE — 86694 HERPES SIMPLEX NES ANTBDY: CPT

## 2024-08-16 RX ORDER — ACYCLOVIR 800 MG/1
800 TABLET ORAL 2 TIMES DAILY
Qty: 30 TABLET | Refills: 2 | Status: SHIPPED | OUTPATIENT
Start: 2024-08-16

## 2024-08-16 NOTE — PROGRESS NOTES
[facility] -  Gynecology / Women's Health Clinic     Subjective:      Patient ID: Van Mariano is a 33 y.o. female.    Chief Complaint:  No chief complaint on file.      History of Present Illness:  The patient presents to the clinic with c/o HSV outbreak genital, started on 2 days ago. Pt reports hx of genital hsv, last outbreak over 1 year ago. Pt reported usually gets outbreak 1-2 x/year. Hx of taking Acyclovir, does not have medication at home.    GYN & OB History:  Patient's last menstrual period was 2024.   Date of Last Pap: 2024    OB History    Para Term  AB Living   1 1 1         SAB IAB Ectopic Multiple Live Births         1        # Outcome Date GA Lbr Erlin/2nd Weight Sex Type Anes PTL Lv   1A Term            1B Term      CS-LTranv          Past Medical History:   Diagnosis Date    Hypertension         Past Surgical History:   Procedure Laterality Date     SECTION          Social History     Tobacco Use    Smoking status: Never    Smokeless tobacco: Never   Substance and Sexual Activity    Alcohol use: Yes     Comment: Wine on occasions    Drug use: Never    Sexual activity: Not Currently        Current Outpatient Medications   Medication Instructions    amLODIPine (NORVASC) 5 mg, Oral, Nightly    losartan (COZAAR) 50 mg, Oral, Daily    medroxyPROGESTERone (DEPO-PROVERA) 150 mg, Intramuscular, Every 3 months    omeprazole (PRILOSEC) 20 mg, Oral, Daily       Review of patient's allergies indicates:   Allergen Reactions    Bactrim [sulfamethoxazole-trimethoprim] Rash    Hydrocodone Rash         Review of Systems:  Review of Systems  Negative except for pertinent findings for positives per HPI.     Objective:     Physical Exam   Visit Vitals  /78   Pulse 77   Temp 98.4 °F (36.9 °C) (Oral)   Resp 18   Ht 6' (1.829 m)   Wt 102.7 kg (226 lb 6.4 oz)   LMP 2024   SpO2 100%   BMI 30.71 kg/m²       GENERAL: Well-developed female. No acute  distress.    SKIN: Normal to inspection, warm and intact.  VULVA: General appearance normal; Lt labia majora with lesion, crusted blister closed.  PSYCHIATRIC: Patient is oriented to person, place, and time. Mood and affect are normal.    Assessment:       ICD-10-CM ICD-9-CM   1. HSV infection  B00.9 054.9       Plan:     1. HSV infection  -     Herpes simplex virus culture [facility]; Vagina  -     HSV 1 & 2, IgG; Future; Expected date: 08/16/2024  -     HSV 1 & 2, IgM; Future; Expected date: 08/16/2024      Keep the affected areas dry and clean.  Do not have sexual contact during active infections as it is contagious.  Safe sex practices. Latex condoms and female condoms may help to prevent the spread of the herpes virus.  Avoid rubbing or touching the blisters and sores. If you do touch the areas, wash your hands thoroughly.  Take medicines only as directed by your health care provider.  If you become pregnant, tell your health care provider if you have had genital herpes.     Labs sent and culture; once resulted will determine treatment with Acyclovir episodic treatment  Follow up for Annual.

## 2024-08-18 LAB
HSV1 IGG SERPL QL IA: NEGATIVE
HSV2 IGG SERPL QL IA: POSITIVE

## 2024-08-19 LAB — BEAKER SEE SCANNED REPORT: NORMAL

## 2024-09-09 ENCOUNTER — OFFICE VISIT (OUTPATIENT)
Dept: INTERNAL MEDICINE | Facility: CLINIC | Age: 34
End: 2024-09-09
Payer: MEDICAID

## 2024-09-09 VITALS
SYSTOLIC BLOOD PRESSURE: 117 MMHG | BODY MASS INDEX: 30.88 KG/M2 | RESPIRATION RATE: 16 BRPM | TEMPERATURE: 98 F | HEIGHT: 72 IN | DIASTOLIC BLOOD PRESSURE: 82 MMHG | HEART RATE: 69 BPM | WEIGHT: 228 LBS

## 2024-09-09 DIAGNOSIS — K21.9 GASTROESOPHAGEAL REFLUX DISEASE, UNSPECIFIED WHETHER ESOPHAGITIS PRESENT: ICD-10-CM

## 2024-09-09 DIAGNOSIS — M25.551 BILATERAL HIP PAIN: ICD-10-CM

## 2024-09-09 DIAGNOSIS — L60.9 FINGERNAIL PROBLEM: ICD-10-CM

## 2024-09-09 DIAGNOSIS — I10 HYPERTENSION, UNSPECIFIED TYPE: ICD-10-CM

## 2024-09-09 DIAGNOSIS — M54.9 BACK PAIN, UNSPECIFIED BACK LOCATION, UNSPECIFIED BACK PAIN LATERALITY, UNSPECIFIED CHRONICITY: ICD-10-CM

## 2024-09-09 DIAGNOSIS — E66.9 OBESITY, UNSPECIFIED CLASSIFICATION, UNSPECIFIED OBESITY TYPE, UNSPECIFIED WHETHER SERIOUS COMORBIDITY PRESENT: ICD-10-CM

## 2024-09-09 DIAGNOSIS — M25.552 BILATERAL HIP PAIN: ICD-10-CM

## 2024-09-09 PROCEDURE — 1160F RVW MEDS BY RX/DR IN RCRD: CPT | Mod: CPTII,,, | Performed by: NURSE PRACTITIONER

## 2024-09-09 PROCEDURE — 4010F ACE/ARB THERAPY RXD/TAKEN: CPT | Mod: CPTII,,, | Performed by: NURSE PRACTITIONER

## 2024-09-09 PROCEDURE — 3079F DIAST BP 80-89 MM HG: CPT | Mod: CPTII,,, | Performed by: NURSE PRACTITIONER

## 2024-09-09 PROCEDURE — 3008F BODY MASS INDEX DOCD: CPT | Mod: CPTII,,, | Performed by: NURSE PRACTITIONER

## 2024-09-09 PROCEDURE — 99214 OFFICE O/P EST MOD 30 MIN: CPT | Mod: S$PBB,,, | Performed by: NURSE PRACTITIONER

## 2024-09-09 PROCEDURE — 1159F MED LIST DOCD IN RCRD: CPT | Mod: CPTII,,, | Performed by: NURSE PRACTITIONER

## 2024-09-09 PROCEDURE — 99215 OFFICE O/P EST HI 40 MIN: CPT | Mod: PBBFAC | Performed by: NURSE PRACTITIONER

## 2024-09-09 PROCEDURE — 3074F SYST BP LT 130 MM HG: CPT | Mod: CPTII,,, | Performed by: NURSE PRACTITIONER

## 2024-09-09 RX ORDER — BUSPIRONE HYDROCHLORIDE 7.5 MG/1
7.5 TABLET ORAL 2 TIMES DAILY
COMMUNITY
Start: 2024-08-13

## 2024-09-09 RX ORDER — AMLODIPINE BESYLATE 5 MG/1
5 TABLET ORAL NIGHTLY
Qty: 90 TABLET | Refills: 3 | Status: SHIPPED | OUTPATIENT
Start: 2024-09-09

## 2024-09-09 RX ORDER — CICLOPIROX 80 MG/ML
SOLUTION TOPICAL NIGHTLY
Qty: 6.6 ML | Refills: 3 | Status: SHIPPED | OUTPATIENT
Start: 2024-09-09

## 2024-09-09 RX ORDER — LOSARTAN POTASSIUM 50 MG/1
50 TABLET ORAL DAILY
Qty: 90 TABLET | Refills: 3 | Status: SHIPPED | OUTPATIENT
Start: 2024-09-09

## 2024-09-09 RX ORDER — OMEPRAZOLE 20 MG/1
20 CAPSULE, DELAYED RELEASE ORAL DAILY
Qty: 30 CAPSULE | Refills: 6 | Status: SHIPPED | OUTPATIENT
Start: 2024-09-09 | End: 2025-09-09

## 2024-09-09 NOTE — PROGRESS NOTES
Internal Medicine Clinic  MARCELO Castellon     Patient Name: Van Mariano   : 1990  MRN:[id number]     Chief Complaint     Chief Complaint   Patient presents with    Hypertension        History of Present Illness     33 year old AAF, presents in clinic for lab review. C/o right finger nail breakage (thumb) x 2yrs. States keaton hip and back pain for several months, atraumatic. Denies radiculopathy. On her feet all day, pain is worse at the end of the day. Employed by [employer]; is online .     PMH HTN, HLD, GERD, HSV2, obesity. BMI 30. Nonsmoker. Reports intermittent keaton lower ext swelling no pitting. Following [facility] GYN clinic.   Denies chest pain, shortness of breath, cough, fever, headache, dizziness, weakness, abdominal pain, nausea, vomiting, diarrhea, constipation , dysuria, depression, anxiety.  Has twins, 7 yrs old (boy and girl). Recently moved to [place], [place] from [place], originally from [facility]. Employed by [employer]; is online .   LMP 3/22/2024. Last had DEPO shot 2023.                      Review of Systems     Review of Systems   Constitutional: Negative.    HENT: Negative.     Eyes: Negative.    Respiratory: Negative.     Cardiovascular: Negative.    Gastrointestinal: Negative.    Endocrine: Negative.    Genitourinary: Negative.    Musculoskeletal:  Positive for arthralgias and back pain.        Keaton hip   Integumentary:  Positive for wound.        Right thumb nail breaking, unhealed x 2 yrs   Allergic/Immunologic: Negative.    Neurological: Negative.    Hematological: Negative.    Psychiatric/Behavioral: Negative.     All other systems reviewed and are negative.       Physical Examination     Visit Vitals  /82 (BP Location: Left arm, Patient Position: Sitting, BP Method: Large (Automatic))   Pulse 69   Temp 98.1 °F (36.7 °C) (Oral)   Resp 16   Ht 6' (1.829 m)   Wt 103.4 kg (228 lb)   BMI 30.92 kg/m²        BP Readings from Last 6 Encounters:   24 117/82    08/16/24 134/78   07/22/24 138/68   07/18/24 (!) 143/105   05/21/24 131/68   04/03/24 123/80   ]    Wt Readings from Last 6 Encounters:   09/09/24 103.4 kg (228 lb)   08/16/24 102.7 kg (226 lb 6.4 oz)   07/22/24 104.4 kg (230 lb 3.2 oz)   07/18/24 103 kg (227 lb 1.2 oz)   05/21/24 104.3 kg (230 lb)   04/03/24 108.5 kg (239 lb 3.2 oz)   ]    BMI Readings from Last 3 Encounters:   09/09/24 30.92 kg/m²   08/16/24 30.71 kg/m²   07/22/24 31.22 kg/m²         Physical Exam  Vitals and nursing note reviewed.   Constitutional:       Appearance: Normal appearance.   HENT:      Head: Normocephalic and atraumatic.      Right Ear: Tympanic membrane, ear canal and external ear normal.      Left Ear: Tympanic membrane, ear canal and external ear normal.      Nose: Nose normal.      Mouth/Throat:      Mouth: Mucous membranes are moist.      Pharynx: Oropharynx is clear.   Eyes:      Extraocular Movements: Extraocular movements intact.      Conjunctiva/sclera: Conjunctivae normal.      Pupils: Pupils are equal, round, and reactive to light.   Cardiovascular:      Rate and Rhythm: Normal rate and regular rhythm.      Pulses: Normal pulses.      Heart sounds: Normal heart sounds.   Pulmonary:      Effort: Pulmonary effort is normal.      Breath sounds: Normal breath sounds.   Abdominal:      General: Abdomen is flat. Bowel sounds are normal.      Palpations: Abdomen is soft.   Musculoskeletal:         General: Normal range of motion.      Cervical back: Normal range of motion and neck supple.   Skin:     General: Skin is warm and dry.      Capillary Refill: Capillary refill takes less than 2 seconds.      Comments: Right thumb nail bed splitting and thick, see pic in media   Neurological:      General: No focal deficit present.      Mental Status: She is alert and oriented to person, place, and time. Mental status is at baseline.   Psychiatric:         Mood and Affect: Mood normal.         Behavior: Behavior normal.         Thought  "Content: Thought content normal.         Judgment: Judgment normal.          Labs / Imaging     Chemistry:  Lab Results   Component Value Date     07/18/2024    K 4.1 07/18/2024    BUN 7.1 07/18/2024    CREATININE 0.68 07/18/2024    EGFRNORACEVR >60 07/18/2024    GLUCOSE 92 07/18/2024    CALCIUM 10.3 (H) 07/18/2024    ALKPHOS 87 07/18/2024    LABPROT 7.9 07/18/2024    ALBUMIN 3.9 07/18/2024    BILIDIR 0.1 04/01/2020    IBILI 0.30 04/01/2020    AST 17 07/18/2024    ALT 11 07/18/2024    [id number] 20 (L) 05/21/2024        No results found for: "HGBA1C", "MICROALBCREA"     Hematology:  Lab Results   Component Value Date    WBC 6.17 07/18/2024    RBC 4.75 07/18/2024    HGB 14.1 07/18/2024    HCT 41.3 07/18/2024    MCV 86.9 07/18/2024    MCH 29.7 07/18/2024    MCHC 34.1 07/18/2024    RDW 12.9 07/18/2024     07/18/2024    MPV 9.1 07/18/2024        Lipid Panel:  Lab Results   Component Value Date    CHOL 177 05/21/2024    HDL 36 05/21/2024    .00 05/21/2024    TRIG 72 05/21/2024    TOTALCHOLEST 5 05/21/2024        Urine:  Lab Results   Component Value Date    APPEARANCEUA CLEAR 07/06/2019    PROTEINUA Negative 07/06/2019    LEUKOCYTESUR Trace (A) 07/06/2019    RBCUA NONE SEEN 07/06/2019    WBCUA 6 (H) 07/06/2019    BACTERIA 1+ (A) 07/06/2019          Assessment       ICD-10-CM ICD-9-CM   1. Hypertension, unspecified type  I10 401.9   2. Gastroesophageal reflux disease, unspecified whether esophagitis present  K21.9 530.81   3. Fingernail problem  L60.9 703.9   4. Bilateral hip pain  M25.551 719.45    M25.552    5. Back pain, unspecified back location, unspecified back pain laterality, unspecified chronicity  M54.9 724.5   6. Obesity, unspecified classification, unspecified obesity type, unspecified whether serious comorbidity present  E66.9 278.00        Plan       1. Hypertension, unspecified type  BP and HR stable. Med refills. DASH diet: Eat more fruits, vegetables, and low fat dairy foods.  (Less " than 2 grams of sodium per day).  Maintain healthy weight with goal BMI <30.   Exercise 30 minutes per day 5 days per week.  Home medications refilled and continued.   Home BP monitoring encouraged with BP parameters given.      2. Gastroesophageal reflux disease, unspecified whether esophagitis present  Continue PPI.   Avoid spicy, acidic, fried foods and alcohol.  Eat 2-3 hours before going to bed.  Avoid tight clothing, chew food thoroughly.  Reduce caffeine intake, avoid soda.     3. Fingernail problem  Refer to [facility] DERM  - Ambulatory referral/consult to Internal Medicine    4. Bilateral hip pain  OTC IBU or tylenol prn  Refer to [facility] PT  Lower back stretches daily.  Avoid strenuous lifting, use proper body mechanics.  Heating pad, Ice pack, Biofreeze or Epsom salt baths as needed.  Exercise to strengthen core muscles to support your back.  - X-Ray Hip 2 or 3 views Left with Pelvis when performed; Future  - X-Ray Hip 2 or 3 views Right with Pelvis when performed; Future  - X-Ray Lumbar Spine 5 View; Future  - Ambulatory referral/consult to Physical/Occupational Therapy; Future    5. Back pain, unspecified back location, unspecified back pain laterality, unspecified chronicity  OTC IBU or tylenol prn  Refer to [facility] PT  Lower back stretches daily.  Avoid strenuous lifting, use proper body mechanics.  Heating pad, Ice pack, Biofreeze or Epsom salt baths as needed.  Exercise to strengthen core muscles to support your back.    - X-Ray Hip 2 or 3 views Left with Pelvis when performed; Future  - X-Ray Hip 2 or 3 views Right with Pelvis when performed; Future  - X-Ray Lumbar Spine 5 View; Future  - Ambulatory referral/consult to Physical/Occupational Therapy; Future    6. Obesity, unspecified classification, unspecified obesity type, unspecified whether serious comorbidity present  Goal BMI <30.  Exercise 5 times a week for 30 minutes per day.  Avoid soda, simple sugars, excessive rice, potatoes or bread. Limit fast foods  and fried foods.  Choose complex carbs in moderation (example: green vegetables, beans, oatmeal). Eat plenty of fresh fruits and vegetables with lean meats daily.  Do not skip meals. Eat a balanced portion size.  Avoid fad diets. Consider permanent healthy life style changes.         Current Outpatient Medications   Medication Instructions    acyclovir (ZOVIRAX) 800 mg, Oral, 2 times daily, X5 days only for episodic treatment. Take at first signs of outbreak with plenty of water.    amLODIPine (NORVASC) 5 mg, Oral, Nightly    busPIRone (BUSPAR) 7.5 mg, Oral, 2 times daily    ciclopirox (PENLAC) 8 % Soln Topical (Top), Nightly    losartan (COZAAR) 50 mg, Oral, Daily    medroxyPROGESTERone (DEPO-PROVERA) 150 mg, Intramuscular, Every 3 months    omeprazole (PRILOSEC) 20 mg, Oral, Daily       Orders Placed This Encounter   Procedures    X-Ray Hip 2 or 3 views Left with Pelvis when performed    X-Ray Hip 2 or 3 views Right with Pelvis when performed    X-Ray Lumbar Spine 5 View    Ambulatory referral/consult to Physical/Occupational Therapy    Ambulatory referral/consult to Dermatology         Future Appointments   Date Time Provider Department Center   10/10/2024  9:40 AM NURSE, [facility] GYN [facility] [place] [facility]   5/26/2025 10:30 AM Anne Alston FNP [facility] [place]        Follow up in about 2 weeks (around 9/23/2024) for for paperwork and xray review.    Labs thoroughly reviewed with patient. Medication refills addressed today.  RTC prn and 2 wks, with labs 1 week prior to the apt.  COVID 19 precautions given to patient.  Patient voices understanding of all discharge instructions.      MARCELO Castellon

## 2024-09-16 ENCOUNTER — HOSPITAL ENCOUNTER (OUTPATIENT)
Dept: RADIOLOGY | Facility: HOSPITAL | Age: 34
Discharge: HOME | End: 2024-09-16
Attending: NURSE PRACTITIONER
Payer: MEDICAID

## 2024-09-16 DIAGNOSIS — M25.551 BILATERAL HIP PAIN: ICD-10-CM

## 2024-09-16 DIAGNOSIS — M54.9 BACK PAIN, UNSPECIFIED BACK LOCATION, UNSPECIFIED BACK PAIN LATERALITY, UNSPECIFIED CHRONICITY: ICD-10-CM

## 2024-09-16 DIAGNOSIS — M25.552 BILATERAL HIP PAIN: ICD-10-CM

## 2024-09-16 PROCEDURE — 73502 X-RAY EXAM HIP UNI 2-3 VIEWS: CPT | Mod: TC,LT

## 2024-09-16 PROCEDURE — 72110 X-RAY EXAM L-2 SPINE 4/>VWS: CPT | Mod: TC

## 2024-09-16 PROCEDURE — 73502 X-RAY EXAM HIP UNI 2-3 VIEWS: CPT | Mod: TC,RT

## 2024-09-25 ENCOUNTER — OFFICE VISIT (OUTPATIENT)
Dept: INTERNAL MEDICINE | Facility: CLINIC | Age: 34
End: 2024-09-25
Payer: MEDICAID

## 2024-09-25 ENCOUNTER — PATIENT MESSAGE (OUTPATIENT)
Dept: INTERNAL MEDICINE | Facility: CLINIC | Age: 34
End: 2024-09-25

## 2024-09-25 VITALS
BODY MASS INDEX: 30.66 KG/M2 | SYSTOLIC BLOOD PRESSURE: 119 MMHG | DIASTOLIC BLOOD PRESSURE: 83 MMHG | HEART RATE: 87 BPM | OXYGEN SATURATION: 99 % | WEIGHT: 226.38 LBS | TEMPERATURE: 99 F | HEIGHT: 72 IN

## 2024-09-25 DIAGNOSIS — M54.9 BACK PAIN, UNSPECIFIED BACK LOCATION, UNSPECIFIED BACK PAIN LATERALITY, UNSPECIFIED CHRONICITY: ICD-10-CM

## 2024-09-25 DIAGNOSIS — I10 HYPERTENSION, UNSPECIFIED TYPE: ICD-10-CM

## 2024-09-25 DIAGNOSIS — E66.9 OBESITY, UNSPECIFIED CLASSIFICATION, UNSPECIFIED OBESITY TYPE, UNSPECIFIED WHETHER SERIOUS COMORBIDITY PRESENT: ICD-10-CM

## 2024-09-25 DIAGNOSIS — E55.9 VITAMIN D DEFICIENCY: ICD-10-CM

## 2024-09-25 DIAGNOSIS — Z02.89 ENCOUNTER FOR COMPLETION OF FORM WITH PATIENT: ICD-10-CM

## 2024-09-25 PROCEDURE — 4010F ACE/ARB THERAPY RXD/TAKEN: CPT | Mod: CPTII,,, | Performed by: NURSE PRACTITIONER

## 2024-09-25 PROCEDURE — 3079F DIAST BP 80-89 MM HG: CPT | Mod: CPTII,,, | Performed by: NURSE PRACTITIONER

## 2024-09-25 PROCEDURE — 99214 OFFICE O/P EST MOD 30 MIN: CPT | Mod: PBBFAC | Performed by: NURSE PRACTITIONER

## 2024-09-25 PROCEDURE — 1159F MED LIST DOCD IN RCRD: CPT | Mod: CPTII,,, | Performed by: NURSE PRACTITIONER

## 2024-09-25 PROCEDURE — 3008F BODY MASS INDEX DOCD: CPT | Mod: CPTII,,, | Performed by: NURSE PRACTITIONER

## 2024-09-25 PROCEDURE — 99214 OFFICE O/P EST MOD 30 MIN: CPT | Mod: S$PBB,,, | Performed by: NURSE PRACTITIONER

## 2024-09-25 PROCEDURE — 3074F SYST BP LT 130 MM HG: CPT | Mod: CPTII,,, | Performed by: NURSE PRACTITIONER

## 2024-09-25 PROCEDURE — 1160F RVW MEDS BY RX/DR IN RCRD: CPT | Mod: CPTII,,, | Performed by: NURSE PRACTITIONER

## 2024-09-25 RX ORDER — LOSARTAN POTASSIUM 25 MG/1
50 TABLET ORAL
COMMUNITY
Start: 2024-09-12

## 2024-09-25 NOTE — PROGRESS NOTES
Internal Medicine Clinic  MARCELO Castellon     Patient Name: Van Mariano   : 1990  MRN:[id number]     Chief Complaint     Chief Complaint   Patient presents with    Follow-up    x-ray results         History of Present Illness     33 year old AAF, presents in clinic for lxray results Lumbar spine and hip.   Has not set up PT at [facility], but will call this week to schedule.  Request intermittent leave paperwork (start date today 24 through the year completion). We are waiting for fax, I have not received [place] paperwork.     States steven hip and back pain for several months, atraumatic. Denies radiculopathy. On her feet all day, pain is worse at the end of the day. Employed by [employer]; is online .      PMH HTN, HLD, GERD, HSV2, obesity. BMI 30. Nonsmoker. Reports intermittent steven lower ext swelling no pitting. Following [facility] GYN clinic.   Denies chest pain, shortness of breath, cough, fever, headache, dizziness, weakness, abdominal pain, nausea, vomiting, diarrhea, constipation , dysuria, depression, anxiety.  Has twins, 7 yrs old (boy and girl). Recently moved to [place], [place] from [place], originally from [facility]. Employed by [employer]; is online .   LMP 3/22/2024. Last had DEPO shot 2023.            Review of Systems     Review of Systems   Constitutional: Negative.    HENT: Negative.     Eyes: Negative.    Respiratory: Negative.     Cardiovascular: Negative.    Gastrointestinal: Negative.    Endocrine: Negative.    Genitourinary: Negative.    Musculoskeletal:  Positive for back pain.   Integumentary:  Negative.   Allergic/Immunologic: Negative.    Neurological: Negative.    Hematological: Negative.    Psychiatric/Behavioral: Negative.     All other systems reviewed and are negative.       Physical Examination     Visit Vitals  /83 (BP Location: Left arm, Patient Position: Sitting, BP Method: Medium (Automatic))   Pulse 87   Temp 99 °F (37.2 °C) (Oral)   Ht 6' (1.829 m)   Wt  102.7 kg (226 lb 6.4 oz)   LMP  (Approximate)   SpO2 99%   BMI 30.71 kg/m²        BP Readings from Last 6 Encounters:   09/25/24 119/83   09/09/24 117/82   08/16/24 134/78   07/22/24 138/68   07/18/24 (!) 143/105   05/21/24 131/68   ]    Wt Readings from Last 6 Encounters:   09/25/24 102.7 kg (226 lb 6.4 oz)   09/09/24 103.4 kg (228 lb)   08/16/24 102.7 kg (226 lb 6.4 oz)   07/22/24 104.4 kg (230 lb 3.2 oz)   07/18/24 103 kg (227 lb 1.2 oz)   05/21/24 104.3 kg (230 lb)   ]    BMI Readings from Last 3 Encounters:   09/25/24 30.71 kg/m²   09/09/24 30.92 kg/m²   08/16/24 30.71 kg/m²         Physical Exam  Vitals and nursing note reviewed.   Constitutional:       Appearance: Normal appearance.   HENT:      Head: Normocephalic and atraumatic.      Right Ear: Tympanic membrane, ear canal and external ear normal.      Left Ear: Tympanic membrane, ear canal and external ear normal.      Nose: Nose normal.      Mouth/Throat:      Mouth: Mucous membranes are moist.      Pharynx: Oropharynx is clear.   Eyes:      Extraocular Movements: Extraocular movements intact.      Conjunctiva/sclera: Conjunctivae normal.      Pupils: Pupils are equal, round, and reactive to light.   Cardiovascular:      Rate and Rhythm: Normal rate and regular rhythm.      Pulses: Normal pulses.      Heart sounds: Normal heart sounds.   Pulmonary:      Effort: Pulmonary effort is normal.      Breath sounds: Normal breath sounds.   Abdominal:      General: Abdomen is flat. Bowel sounds are normal.      Palpations: Abdomen is soft.   Musculoskeletal:         General: Normal range of motion.      Cervical back: Normal range of motion and neck supple.   Skin:     General: Skin is warm and dry.      Capillary Refill: Capillary refill takes less than 2 seconds.   Neurological:      General: No focal deficit present.      Mental Status: She is alert and oriented to person, place, and time. Mental status is at baseline.   Psychiatric:         Mood and Affect:  "Mood normal.         Behavior: Behavior normal.         Thought Content: Thought content normal.         Judgment: Judgment normal.          Labs / Imaging     Chemistry:  Lab Results   Component Value Date     07/18/2024    K 4.1 07/18/2024    BUN 7.1 07/18/2024    CREATININE 0.68 07/18/2024    EGFRNORACEVR >60 07/18/2024    GLUCOSE 92 07/18/2024    CALCIUM 10.3 (H) 07/18/2024    ALKPHOS 87 07/18/2024    LABPROT 7.9 07/18/2024    ALBUMIN 3.9 07/18/2024    BILIDIR 0.1 04/01/2020    IBILI 0.30 04/01/2020    AST 17 07/18/2024    ALT 11 07/18/2024    [id number] 20 (L) 05/21/2024        No results found for: "HGBA1C", "MICROALBCREA"     Hematology:  Lab Results   Component Value Date    WBC 6.17 07/18/2024    RBC 4.75 07/18/2024    HGB 14.1 07/18/2024    HCT 41.3 07/18/2024    MCV 86.9 07/18/2024    MCH 29.7 07/18/2024    MCHC 34.1 07/18/2024    RDW 12.9 07/18/2024     07/18/2024    MPV 9.1 07/18/2024        Lipid Panel:  Lab Results   Component Value Date    CHOL 177 05/21/2024    HDL 36 05/21/2024    .00 05/21/2024    TRIG 72 05/21/2024    TOTALCHOLEST 5 05/21/2024        Urine:  Lab Results   Component Value Date    APPEARANCEUA CLEAR 07/06/2019    PROTEINUA Negative 07/06/2019    LEUKOCYTESUR Trace (A) 07/06/2019    RBCUA NONE SEEN 07/06/2019    WBCUA 6 (H) 07/06/2019    BACTERIA 1+ (A) 07/06/2019          Assessment       ICD-10-CM ICD-9-CM   1. Hypertension, unspecified type  I10 401.9   2. Back pain, unspecified back location, unspecified back pain laterality, unspecified chronicity  M54.9 724.5   3. Obesity, unspecified classification, unspecified obesity type, unspecified whether serious comorbidity present  E66.9 278.00   4. Encounter for completion of form with patient  Z02.89 V68.89   5. Vitamin D deficiency  E55.9 268.9        Plan   1. Hypertension, unspecified type  Low salt diet  BP stable.   Meds cont.    2. Back pain, unspecified back location, unspecified back pain laterality, " unspecified chronicity  OTC IBU or tylenol  Lower back stretches daily.  Avoid strenuous lifting, use proper body mechanics.  Heating pad, Ice pack, Biofreeze or Epsom salt baths as needed.  Exercise to strengthen core muscles to support your back.  Start physical therapy (PT) as referred    3. Obesity, unspecified classification, unspecified obesity type, unspecified whether serious comorbidity present  Goal BMI <30.  Exercise 5 times a week for 30 minutes per day.  Avoid soda, simple sugars, excessive rice, potatoes or bread. Limit fast foods and fried foods.  Choose complex carbs in moderation (example: green vegetables, beans, oatmeal). Eat plenty of fresh fruits and vegetables with lean meats daily.  Do not skip meals. Eat a balanced portion size.  Avoid fad diets. Consider permanent healthy life style changes.       4. Encounter for completion of form with patient  Will complete form once it is faxed for intermittent leave        Current Outpatient Medications   Medication Instructions    acyclovir (ZOVIRAX) 800 mg, Oral, 2 times daily, X5 days only for episodic treatment. Take at first signs of outbreak with plenty of water.    amLODIPine (NORVASC) 5 mg, Oral, Nightly    busPIRone (BUSPAR) 7.5 mg, Oral, 2 times daily    ciclopirox (PENLAC) 8 % Soln Topical (Top), Nightly    losartan (COZAAR) 50 mg, Oral    medroxyPROGESTERone (DEPO-PROVERA) 150 mg, Intramuscular, Every 3 months    omeprazole (PRILOSEC) 20 mg, Oral, Daily       Orders Placed This Encounter   Procedures    CBC Auto Differential    Comprehensive Metabolic Panel    Lipid Panel    Vitamin D         Future Appointments   Date Time Provider Department Center   10/10/2024  9:40 AM NURSE, [facility] [place] [facility]   12/30/2024 12:00 PM Felipa Bartlett FNP [facility]   5/26/2025 10:30 AM Anne Alston FNP [facility] [place] [facility]        Follow up in about 3 months (around 12/25/2024) for physical therapy progress.    Labs  thoroughly reviewed with patient. Medication refills addressed today.  RTC prn and 3 months, with labs 1 week prior to the apt.  COVID 19 precautions given to patient.  Patient voices understanding of all discharge instructions.      MARCELO Castellon

## 2024-09-30 ENCOUNTER — TELEPHONE (OUTPATIENT)
Dept: INTERNAL MEDICINE | Facility: CLINIC | Age: 34
End: 2024-09-30
Payer: MEDICAID

## 2024-09-30 NOTE — TELEPHONE ENCOUNTER
Attempted to contact pt   X2 to inform her that her Medical Certification paperwork has been completed by PCP . Phone is not in service. Called pt 's sister noted in chart . Spoke to pt's sister whom provided me with pt's mom's contact number [phone] and stated pt should be with her mom at this time.  Called and spoke to pt's mom . Pt's mom stated pt is not present with her at this time , but will give pt message to return call to [facility].

## 2024-10-01 ENCOUNTER — TELEPHONE (OUTPATIENT)
Dept: INTERNAL MEDICINE | Facility: CLINIC | Age: 34
End: 2024-10-01
Payer: MEDICAID

## 2024-10-01 NOTE — TELEPHONE ENCOUNTER
Spoke with patient informing her that [facility] paper requires her signature,states will come in tomorrow morning and sign so paper work can be faxed to her job

## 2024-10-01 NOTE — TELEPHONE ENCOUNTER
Spoke with patient informing her that [facility] paper requires her signature,states will come in tomorrow morning and sign so paper work can be faxed to her job

## 2024-10-01 NOTE — TELEPHONE ENCOUNTER
----- Message from [place] sent at 9/27/2024  8:04 AM CDT -----  .Who Called: Dealicia J Harmon    Caller is requesting assistance/information from provider's office.    Symptoms (please be specific):    How long has patient had these symptoms:    List of preferred pharmacies on file (remove unneeded):       Preferred Method of Contact: Phone Call  Patient's Preferred Phone Number on File: [phone]   Best Call Back Number, if different:  Additional Information: pt called to check on status of paperwork

## 2024-10-02 ENCOUNTER — TELEPHONE (OUTPATIENT)
Dept: INTERNAL MEDICINE | Facility: CLINIC | Age: 34
End: 2024-10-02
Payer: MEDICAID

## 2024-10-02 NOTE — TELEPHONE ENCOUNTER
Pt came to [facility] and signed her [place] paperwork . Faxed paperwork work to number provided and received successful confirmation . Paperwork and confirmation scanned in pt's chart .

## 2024-10-06 ENCOUNTER — PATIENT MESSAGE (OUTPATIENT)
Dept: INTERNAL MEDICINE | Facility: CLINIC | Age: 34
End: 2024-10-06
Payer: MEDICAID

## 2024-10-10 ENCOUNTER — CLINICAL SUPPORT (OUTPATIENT)
Dept: GYNECOLOGY | Facility: CLINIC | Age: 34
End: 2024-10-10
Payer: MEDICAID

## 2024-10-10 DIAGNOSIS — Z30.9 ENCOUNTER FOR CONTRACEPTIVE MANAGEMENT, UNSPECIFIED TYPE: Primary | ICD-10-CM

## 2024-10-10 PROCEDURE — 99211 OFF/OP EST MAY X REQ PHY/QHP: CPT | Mod: PBBFAC

## 2024-10-10 PROCEDURE — 96372 THER/PROPH/DIAG INJ SC/IM: CPT | Mod: PBBFAC

## 2024-10-10 RX ORDER — MEDROXYPROGESTERONE ACETATE 150 MG/ML
INJECTION, SUSPENSION INTRAMUSCULAR
Status: DISCONTINUED
Start: 2024-10-10 | End: 2024-10-10 | Stop reason: HOSPADM

## 2024-10-10 RX ORDER — MEDROXYPROGESTERONE ACETATE 150 MG/ML
150 INJECTION, SUSPENSION INTRAMUSCULAR
Status: COMPLETED | OUTPATIENT
Start: 2024-10-10 | End: 2024-10-10

## 2024-10-10 RX ADMIN — MEDROXYPROGESTERONE ACETATE 150 MG: 150 INJECTION, SUSPENSION INTRAMUSCULAR at 08:10

## 2024-10-14 ENCOUNTER — TELEPHONE (OUTPATIENT)
Dept: INTERNAL MEDICINE | Facility: CLINIC | Age: 34
End: 2024-10-14
Payer: MEDICAID

## 2024-10-14 NOTE — TELEPHONE ENCOUNTER
Hey, I'm trying to complete a PA for ciclopirox (PENLAC) 8 % Soln for the patient. I need either a Dx or Dx code. Please advise.

## 2024-11-27 ENCOUNTER — TELEPHONE (OUTPATIENT)
Dept: INTERNAL MEDICINE | Facility: CLINIC | Age: 34
End: 2024-11-27
Payer: MEDICAID

## 2024-11-27 NOTE — TELEPHONE ENCOUNTER
----- Message from [place] sent at 11/27/2024  8:56 AM CST -----  .Who Called: Van Mariano    Refill or New Rx:Refill  RX Name and Strength:losartan (COZAAR) 25 MG tablet  How is the patient currently taking it? (ex. 1XDay):Sig - Route: Take 50 mg by mouth. - Oral  Is this a 30 day or 90 day RX:90 day  Local or Mail Order:local  List of preferred pharmacies on file (remove unneeded): [facility] [address] [place] [address] [id number]  Ordering Provider:      Preferred Method of Contact: Phone Call  Patient's Preferred Phone Number on File: [phone]   Best Call Back Number, if different:  Additional Information:

## 2024-11-27 NOTE — TELEPHONE ENCOUNTER
Spoke with [facility] pharmacy she informed me that patient picked up med on 11/7/24 patient has no refills.Please advise

## 2024-12-02 RX ORDER — LOSARTAN POTASSIUM 50 MG/1
50 TABLET ORAL
Qty: 90 TABLET | Refills: 1 | Status: SHIPPED | OUTPATIENT
Start: 2024-12-02